# Patient Record
Sex: MALE | Race: WHITE | Employment: OTHER | URBAN - METROPOLITAN AREA
[De-identification: names, ages, dates, MRNs, and addresses within clinical notes are randomized per-mention and may not be internally consistent; named-entity substitution may affect disease eponyms.]

---

## 2017-01-01 ENCOUNTER — TELEPHONE (OUTPATIENT)
Dept: ADMINISTRATIVE | Age: 67
End: 2017-01-01

## 2018-01-01 ENCOUNTER — APPOINTMENT (OUTPATIENT)
Dept: GENERAL RADIOLOGY | Age: 68
DRG: 853 | End: 2018-01-01
Payer: MEDICARE

## 2018-01-01 ENCOUNTER — APPOINTMENT (OUTPATIENT)
Dept: CT IMAGING | Age: 68
DRG: 853 | End: 2018-01-01
Payer: MEDICARE

## 2018-01-01 ENCOUNTER — HOSPITAL ENCOUNTER (INPATIENT)
Age: 68
LOS: 11 days | Discharge: HOSPICE | DRG: 853 | End: 2018-01-23
Attending: EMERGENCY MEDICINE | Admitting: INTERNAL MEDICINE
Payer: MEDICARE

## 2018-01-01 ENCOUNTER — ANESTHESIA EVENT (OUTPATIENT)
Dept: OPERATING ROOM | Age: 68
DRG: 853 | End: 2018-01-01
Payer: MEDICARE

## 2018-01-01 ENCOUNTER — APPOINTMENT (OUTPATIENT)
Dept: INTERVENTIONAL RADIOLOGY/VASCULAR | Age: 68
DRG: 853 | End: 2018-01-01
Payer: MEDICARE

## 2018-01-01 ENCOUNTER — ANESTHESIA (OUTPATIENT)
Dept: OPERATING ROOM | Age: 68
DRG: 853 | End: 2018-01-01
Payer: MEDICARE

## 2018-01-01 VITALS
SYSTOLIC BLOOD PRESSURE: 110 MMHG | TEMPERATURE: 98.7 F | BODY MASS INDEX: 19.31 KG/M2 | HEIGHT: 73 IN | RESPIRATION RATE: 23 BRPM | HEART RATE: 79 BPM | DIASTOLIC BLOOD PRESSURE: 62 MMHG | WEIGHT: 145.72 LBS | OXYGEN SATURATION: 98 %

## 2018-01-01 VITALS — DIASTOLIC BLOOD PRESSURE: 64 MMHG | SYSTOLIC BLOOD PRESSURE: 98 MMHG | OXYGEN SATURATION: 100 %

## 2018-01-01 VITALS — OXYGEN SATURATION: 100 % | TEMPERATURE: 93.4 F

## 2018-01-01 DIAGNOSIS — G10 HUNTINGTON'S CHOREA (HCC): ICD-10-CM

## 2018-01-01 DIAGNOSIS — R53.1 GENERAL WEAKNESS: Primary | ICD-10-CM

## 2018-01-01 DIAGNOSIS — J90 PLEURAL EFFUSION ON LEFT: ICD-10-CM

## 2018-01-01 LAB
ABO/RH: NORMAL
ALBUMIN SERPL-MCNC: 1.4 G/DL (ref 3.9–4.9)
ALBUMIN SERPL-MCNC: 1.5 G/DL (ref 3.9–4.9)
ALBUMIN SERPL-MCNC: 1.6 G/DL (ref 3.9–4.9)
ALBUMIN SERPL-MCNC: 1.7 G/DL (ref 3.9–4.9)
ALBUMIN SERPL-MCNC: 1.7 G/DL (ref 3.9–4.9)
ALBUMIN SERPL-MCNC: 1.8 G/DL (ref 3.9–4.9)
ALBUMIN SERPL-MCNC: 2.3 G/DL (ref 3.9–4.9)
ALP BLD-CCNC: 107 U/L (ref 35–104)
ALP BLD-CCNC: 212 U/L (ref 35–104)
ALP BLD-CCNC: 473 U/L (ref 35–104)
ALP BLD-CCNC: 477 U/L (ref 35–104)
ALT SERPL-CCNC: 168 U/L (ref 0–41)
ALT SERPL-CCNC: 67 U/L (ref 0–41)
ALT SERPL-CCNC: 819 U/L (ref 0–41)
ALT SERPL-CCNC: 864 U/L (ref 0–41)
ANAEROBIC CULTURE: ABNORMAL
ANAEROBIC CULTURE: ABNORMAL
ANION GAP SERPL CALCULATED.3IONS-SCNC: 10 MEQ/L (ref 7–13)
ANION GAP SERPL CALCULATED.3IONS-SCNC: 11 MEQ/L (ref 7–13)
ANION GAP SERPL CALCULATED.3IONS-SCNC: 12 MEQ/L (ref 7–13)
ANION GAP SERPL CALCULATED.3IONS-SCNC: 13 MEQ/L (ref 7–13)
ANION GAP SERPL CALCULATED.3IONS-SCNC: 13 MEQ/L (ref 7–13)
ANION GAP SERPL CALCULATED.3IONS-SCNC: 14 MEQ/L (ref 7–13)
ANION GAP SERPL CALCULATED.3IONS-SCNC: 15 MEQ/L (ref 7–13)
ANION GAP SERPL CALCULATED.3IONS-SCNC: 16 MEQ/L (ref 7–13)
ANION GAP SERPL CALCULATED.3IONS-SCNC: 2 MEQ/L (ref 7–13)
ANION GAP SERPL CALCULATED.3IONS-SCNC: 7 MEQ/L (ref 7–13)
ANION GAP SERPL CALCULATED.3IONS-SCNC: 9 MEQ/L (ref 7–13)
ANION GAP SERPL CALCULATED.3IONS-SCNC: 9 MEQ/L (ref 7–13)
ANISOCYTOSIS: ABNORMAL
ANISOCYTOSIS: ABNORMAL
ANTIBODY SCREEN: NORMAL
APTT: 35.7 SEC (ref 21.6–35.4)
APTT: 39.1 SEC (ref 21.6–35.4)
AST SERPL-CCNC: 160 U/L (ref 0–40)
AST SERPL-CCNC: 472 U/L (ref 0–40)
AST SERPL-CCNC: 557 U/L (ref 0–40)
AST SERPL-CCNC: 73 U/L (ref 0–40)
BACTERIA: ABNORMAL /HPF
BANDED NEUTROPHILS RELATIVE PERCENT: 21 %
BANDED NEUTROPHILS RELATIVE PERCENT: 28 %
BASE EXCESS ARTERIAL: -3 (ref -3–3)
BASE EXCESS ARTERIAL: -5 (ref -3–3)
BASOPHILS ABSOLUTE: 0 K/UL (ref 0–0.2)
BASOPHILS RELATIVE PERCENT: 0 %
BASOPHILS RELATIVE PERCENT: 0.1 %
BASOPHILS RELATIVE PERCENT: 0.1 %
BASOPHILS RELATIVE PERCENT: 0.2 %
BILIRUB SERPL-MCNC: 0.6 MG/DL (ref 0–1.2)
BILIRUB SERPL-MCNC: 1.7 MG/DL (ref 0–1.2)
BILIRUB SERPL-MCNC: 2 MG/DL (ref 0–1.2)
BILIRUB SERPL-MCNC: 2 MG/DL (ref 0–1.2)
BILIRUBIN DIRECT: 1 MG/DL (ref 0–0.3)
BILIRUBIN URINE: ABNORMAL
BILIRUBIN URINE: ABNORMAL
BILIRUBIN, INDIRECT: 0.7 MG/DL (ref 0–0.6)
BLOOD BANK DISPENSE STATUS: NORMAL
BLOOD BANK DISPENSE STATUS: NORMAL
BLOOD BANK PRODUCT CODE: NORMAL
BLOOD BANK PRODUCT CODE: NORMAL
BLOOD CULTURE, ROUTINE: NORMAL
BLOOD, URINE: ABNORMAL
BLOOD, URINE: NEGATIVE
BPU ID: NORMAL
BPU ID: NORMAL
BUN BLDV-MCNC: 23 MG/DL (ref 8–23)
BUN BLDV-MCNC: 25 MG/DL (ref 8–23)
BUN BLDV-MCNC: 29 MG/DL (ref 8–23)
BUN BLDV-MCNC: 30 MG/DL (ref 8–23)
BUN BLDV-MCNC: 31 MG/DL (ref 8–23)
BUN BLDV-MCNC: 32 MG/DL (ref 8–23)
BUN BLDV-MCNC: 48 MG/DL (ref 8–23)
BUN BLDV-MCNC: 50 MG/DL (ref 8–23)
BUN BLDV-MCNC: 51 MG/DL (ref 8–23)
BUN BLDV-MCNC: 53 MG/DL (ref 8–23)
BUN BLDV-MCNC: 56 MG/DL (ref 8–23)
BUN BLDV-MCNC: 58 MG/DL (ref 8–23)
BUN BLDV-MCNC: 65 MG/DL (ref 8–23)
BUN BLDV-MCNC: 78 MG/DL (ref 8–23)
CALCIUM SERPL-MCNC: 6.9 MG/DL (ref 8.6–10.2)
CALCIUM SERPL-MCNC: 7.1 MG/DL (ref 8.6–10.2)
CALCIUM SERPL-MCNC: 7.1 MG/DL (ref 8.6–10.2)
CALCIUM SERPL-MCNC: 7.2 MG/DL (ref 8.6–10.2)
CALCIUM SERPL-MCNC: 7.4 MG/DL (ref 8.6–10.2)
CALCIUM SERPL-MCNC: 7.4 MG/DL (ref 8.6–10.2)
CALCIUM SERPL-MCNC: 7.5 MG/DL (ref 8.6–10.2)
CALCIUM SERPL-MCNC: 7.5 MG/DL (ref 8.6–10.2)
CALCIUM SERPL-MCNC: 8 MG/DL (ref 8.6–10.2)
CALCIUM SERPL-MCNC: 8 MG/DL (ref 8.6–10.2)
CALCIUM SERPL-MCNC: 8.1 MG/DL (ref 8.6–10.2)
CALCIUM SERPL-MCNC: 8.2 MG/DL (ref 8.6–10.2)
CALCIUM SERPL-MCNC: 8.2 MG/DL (ref 8.6–10.2)
CALCIUM SERPL-MCNC: 8.6 MG/DL (ref 8.6–10.2)
CARBOXYHEMOGLOBIN: 3.3 % (ref 0–4)
CASTS 2: ABNORMAL /LPF
CASTS: ABNORMAL /LPF
CHLORIDE BLD-SCNC: 104 MEQ/L (ref 98–107)
CHLORIDE BLD-SCNC: 106 MEQ/L (ref 98–107)
CHLORIDE BLD-SCNC: 107 MEQ/L (ref 98–107)
CHLORIDE BLD-SCNC: 108 MEQ/L (ref 98–107)
CHLORIDE BLD-SCNC: 110 MEQ/L (ref 98–107)
CHLORIDE BLD-SCNC: 114 MEQ/L (ref 98–107)
CHLORIDE BLD-SCNC: 114 MEQ/L (ref 98–107)
CHLORIDE BLD-SCNC: 115 MEQ/L (ref 98–107)
CHLORIDE BLD-SCNC: 115 MEQ/L (ref 98–107)
CHLORIDE BLD-SCNC: 116 MEQ/L (ref 98–107)
CHLORIDE BLD-SCNC: 116 MEQ/L (ref 98–107)
CHLORIDE BLD-SCNC: 117 MEQ/L (ref 98–107)
CHLORIDE BLD-SCNC: 119 MEQ/L (ref 98–107)
CHLORIDE BLD-SCNC: 120 MEQ/L (ref 98–107)
CHLORIDE BLD-SCNC: 121 MEQ/L (ref 98–107)
CHLORIDE BLD-SCNC: 122 MEQ/L (ref 98–107)
CLARITY: ABNORMAL
CLARITY: ABNORMAL
CO2: 21 MEQ/L (ref 22–29)
CO2: 22 MEQ/L (ref 22–29)
CO2: 23 MEQ/L (ref 22–29)
CO2: 24 MEQ/L (ref 22–29)
CO2: 25 MEQ/L (ref 22–29)
CO2: 26 MEQ/L (ref 22–29)
CO2: 26 MEQ/L (ref 22–29)
CO2: 29 MEQ/L (ref 22–29)
COLOR: ABNORMAL
COLOR: ABNORMAL
CREAT SERPL-MCNC: 0.41 MG/DL (ref 0.7–1.2)
CREAT SERPL-MCNC: 0.47 MG/DL (ref 0.7–1.2)
CREAT SERPL-MCNC: 0.48 MG/DL (ref 0.7–1.2)
CREAT SERPL-MCNC: 0.61 MG/DL (ref 0.7–1.2)
CREAT SERPL-MCNC: 0.68 MG/DL (ref 0.7–1.2)
CREAT SERPL-MCNC: 0.71 MG/DL (ref 0.7–1.2)
CREAT SERPL-MCNC: 0.91 MG/DL (ref 0.7–1.2)
CREAT SERPL-MCNC: 0.98 MG/DL (ref 0.7–1.2)
CREAT SERPL-MCNC: 0.98 MG/DL (ref 0.7–1.2)
CREAT SERPL-MCNC: 1 MG/DL (ref 0.7–1.2)
CREAT SERPL-MCNC: 1.06 MG/DL (ref 0.7–1.2)
CREAT SERPL-MCNC: 1.08 MG/DL (ref 0.7–1.2)
CREAT SERPL-MCNC: 1.1 MG/DL (ref 0.7–1.2)
CREAT SERPL-MCNC: 1.41 MG/DL (ref 0.7–1.2)
CREAT SERPL-MCNC: 1.92 MG/DL (ref 0.7–1.2)
CREAT SERPL-MCNC: 2.07 MG/DL (ref 0.7–1.2)
CULTURE SURGICAL: ABNORMAL
CULTURE SURGICAL: ABNORMAL
CULTURE, BLOOD 2: NORMAL
DESCRIPTION BLOOD BANK: NORMAL
DESCRIPTION BLOOD BANK: NORMAL
EKG ATRIAL RATE: 105 BPM
EKG ATRIAL RATE: 83 BPM
EKG P AXIS: 76 DEGREES
EKG P AXIS: 81 DEGREES
EKG P-R INTERVAL: 130 MS
EKG P-R INTERVAL: 146 MS
EKG Q-T INTERVAL: 332 MS
EKG Q-T INTERVAL: 358 MS
EKG QRS DURATION: 66 MS
EKG QRS DURATION: 78 MS
EKG QTC CALCULATION (BAZETT): 420 MS
EKG QTC CALCULATION (BAZETT): 438 MS
EKG R AXIS: -3 DEGREES
EKG R AXIS: 50 DEGREES
EKG T AXIS: 41 DEGREES
EKG T AXIS: 72 DEGREES
EKG VENTRICULAR RATE: 105 BPM
EKG VENTRICULAR RATE: 83 BPM
EOSINOPHILS ABSOLUTE: 0 K/UL (ref 0–0.7)
EOSINOPHILS RELATIVE PERCENT: 0 %
FOLATE: 10.6 NG/ML (ref 7.3–26.1)
GFR AFRICAN AMERICAN: 39
GFR AFRICAN AMERICAN: 42.5
GFR AFRICAN AMERICAN: >60
GFR NON-AFRICAN AMERICAN: 32.2
GFR NON-AFRICAN AMERICAN: 35.1
GFR NON-AFRICAN AMERICAN: 50.2
GFR NON-AFRICAN AMERICAN: >60
GLOBULIN: 1.9 G/DL (ref 2.3–3.5)
GLOBULIN: 3 G/DL (ref 2.3–3.5)
GLOBULIN: 3.1 G/DL (ref 2.3–3.5)
GLUCOSE BLD-MCNC: 102 MG/DL (ref 74–109)
GLUCOSE BLD-MCNC: 105 MG/DL (ref 74–109)
GLUCOSE BLD-MCNC: 110 MG/DL (ref 74–109)
GLUCOSE BLD-MCNC: 110 MG/DL (ref 74–109)
GLUCOSE BLD-MCNC: 116 MG/DL (ref 74–109)
GLUCOSE BLD-MCNC: 129 MG/DL (ref 74–109)
GLUCOSE BLD-MCNC: 141 MG/DL (ref 74–109)
GLUCOSE BLD-MCNC: 155 MG/DL (ref 74–109)
GLUCOSE BLD-MCNC: 197 MG/DL (ref 74–109)
GLUCOSE BLD-MCNC: 74 MG/DL (ref 74–109)
GLUCOSE BLD-MCNC: 78 MG/DL (ref 74–109)
GLUCOSE BLD-MCNC: 78 MG/DL (ref 74–109)
GLUCOSE BLD-MCNC: 84 MG/DL (ref 74–109)
GLUCOSE BLD-MCNC: 94 MG/DL (ref 74–109)
GLUCOSE BLD-MCNC: 95 MG/DL (ref 74–109)
GLUCOSE BLD-MCNC: 98 MG/DL (ref 74–109)
GLUCOSE URINE: 500 MG/DL
GLUCOSE URINE: NEGATIVE MG/DL
GRAM STAIN RESULT: ABNORMAL
GRAM STAIN RESULT: ABNORMAL
HCO3 ARTERIAL: 20.3 MMOL/L (ref 21–29)
HCO3 ARTERIAL: 21.2 MMOL/L (ref 21–29)
HCT VFR BLD CALC: 28.5 % (ref 42–52)
HCT VFR BLD CALC: 28.5 % (ref 42–52)
HCT VFR BLD CALC: 28.8 % (ref 42–52)
HCT VFR BLD CALC: 30.2 % (ref 42–52)
HCT VFR BLD CALC: 30.9 % (ref 42–52)
HCT VFR BLD CALC: 35.9 % (ref 42–52)
HCT VFR BLD CALC: 36.2 % (ref 42–52)
HCT VFR BLD CALC: 36.3 % (ref 42–52)
HCT VFR BLD CALC: 39.1 % (ref 42–52)
HCT VFR BLD CALC: 39.3 % (ref 42–52)
HCT VFR BLD CALC: 39.5 % (ref 42–52)
HCT VFR BLD CALC: 40 % (ref 42–52)
HCT VFR BLD CALC: 42 % (ref 42–52)
HEMOGLOBIN: 10 G/DL (ref 14–18)
HEMOGLOBIN: 11.3 G/DL (ref 14–18)
HEMOGLOBIN: 11.4 G/DL (ref 14–18)
HEMOGLOBIN: 11.6 G/DL (ref 14–18)
HEMOGLOBIN: 12.6 G/DL (ref 14–18)
HEMOGLOBIN: 12.6 G/DL (ref 14–18)
HEMOGLOBIN: 12.8 G/DL (ref 14–18)
HEMOGLOBIN: 12.9 G/DL (ref 14–18)
HEMOGLOBIN: 13.6 G/DL (ref 14–18)
HEMOGLOBIN: 9.3 G/DL (ref 14–18)
HEMOGLOBIN: 9.8 G/DL (ref 14–18)
HYPOCHROMIA: ABNORMAL
HYPOCHROMIA: ABNORMAL
INR BLD: 1.3
INR BLD: 1.4
KETONES, URINE: 15 MG/DL
KETONES, URINE: NEGATIVE MG/DL
LACTATE: 2.06 MMOL/L (ref 0.4–2)
LACTATE: 3.19 MMOL/L (ref 0.4–2)
LACTIC ACID: 4.2 MMOL/L (ref 0.5–2.2)
LEUKOCYTE ESTERASE, URINE: ABNORMAL
LEUKOCYTE ESTERASE, URINE: NEGATIVE
LYMPHOCYTES ABSOLUTE: 0.2 K/UL (ref 1–4.8)
LYMPHOCYTES ABSOLUTE: 0.6 K/UL (ref 1–4.8)
LYMPHOCYTES ABSOLUTE: 0.7 K/UL (ref 1–4.8)
LYMPHOCYTES ABSOLUTE: 1 K/UL (ref 1–4.8)
LYMPHOCYTES RELATIVE PERCENT: 1 %
LYMPHOCYTES RELATIVE PERCENT: 3 %
LYMPHOCYTES RELATIVE PERCENT: 3.1 %
LYMPHOCYTES RELATIVE PERCENT: 3.8 %
MACROCYTES: ABNORMAL
MACROCYTES: ABNORMAL
MAGNESIUM: 1.8 MG/DL (ref 1.7–2.3)
MAGNESIUM: 2.5 MG/DL (ref 1.7–2.3)
MAGNESIUM: 2.5 MG/DL (ref 1.7–2.3)
MAGNESIUM: 2.6 MG/DL (ref 1.7–2.3)
MCH RBC QN AUTO: 32.9 PG (ref 27–31.3)
MCH RBC QN AUTO: 32.9 PG (ref 27–31.3)
MCH RBC QN AUTO: 33.1 PG (ref 27–31.3)
MCH RBC QN AUTO: 33.2 PG (ref 27–31.3)
MCH RBC QN AUTO: 33.2 PG (ref 27–31.3)
MCH RBC QN AUTO: 33.4 PG (ref 27–31.3)
MCH RBC QN AUTO: 33.5 PG (ref 27–31.3)
MCHC RBC AUTO-ENTMCNC: 31.5 % (ref 33–37)
MCHC RBC AUTO-ENTMCNC: 31.5 % (ref 33–37)
MCHC RBC AUTO-ENTMCNC: 31.9 % (ref 33–37)
MCHC RBC AUTO-ENTMCNC: 32 % (ref 33–37)
MCHC RBC AUTO-ENTMCNC: 32.2 % (ref 33–37)
MCHC RBC AUTO-ENTMCNC: 32.2 % (ref 33–37)
MCHC RBC AUTO-ENTMCNC: 32.3 % (ref 33–37)
MCHC RBC AUTO-ENTMCNC: 32.3 % (ref 33–37)
MCHC RBC AUTO-ENTMCNC: 32.4 % (ref 33–37)
MCHC RBC AUTO-ENTMCNC: 32.5 % (ref 33–37)
MCHC RBC AUTO-ENTMCNC: 32.6 % (ref 33–37)
MCHC RBC AUTO-ENTMCNC: 32.6 % (ref 33–37)
MCHC RBC AUTO-ENTMCNC: 32.7 % (ref 33–37)
MCV RBC AUTO: 101.7 FL (ref 80–100)
MCV RBC AUTO: 102.3 FL (ref 80–100)
MCV RBC AUTO: 102.4 FL (ref 80–100)
MCV RBC AUTO: 102.5 FL (ref 80–100)
MCV RBC AUTO: 102.5 FL (ref 80–100)
MCV RBC AUTO: 102.7 FL (ref 80–100)
MCV RBC AUTO: 103.7 FL (ref 80–100)
MCV RBC AUTO: 103.8 FL (ref 80–100)
MCV RBC AUTO: 103.8 FL (ref 80–100)
MCV RBC AUTO: 104.2 FL (ref 80–100)
MCV RBC AUTO: 104.4 FL (ref 80–100)
MCV RBC AUTO: 104.5 FL (ref 80–100)
MCV RBC AUTO: 104.7 FL (ref 80–100)
MICROCYTES: 0
MONOCYTES ABSOLUTE: 0.3 K/UL (ref 0.2–0.8)
MONOCYTES ABSOLUTE: 0.5 K/UL (ref 0.2–0.8)
MONOCYTES ABSOLUTE: 0.8 K/UL (ref 0.2–0.8)
MONOCYTES ABSOLUTE: 1 K/UL (ref 0.2–0.8)
MONOCYTES RELATIVE PERCENT: 1.5 %
MONOCYTES RELATIVE PERCENT: 2.5 %
MONOCYTES RELATIVE PERCENT: 2.8 %
MONOCYTES RELATIVE PERCENT: 4.1 %
NEUTROPHILS ABSOLUTE: 18.1 K/UL (ref 1.4–6.5)
NEUTROPHILS ABSOLUTE: 19 K/UL (ref 1.4–6.5)
NEUTROPHILS ABSOLUTE: 20 K/UL (ref 1.4–6.5)
NEUTROPHILS ABSOLUTE: 30.4 K/UL (ref 1.4–6.5)
NEUTROPHILS RELATIVE PERCENT: 66 %
NEUTROPHILS RELATIVE PERCENT: 74 %
NEUTROPHILS RELATIVE PERCENT: 94.2 %
NEUTROPHILS RELATIVE PERCENT: 94.6 %
NITRITE, URINE: NEGATIVE
NITRITE, URINE: POSITIVE
O2 SAT, ARTERIAL: 100 % (ref 93–100)
O2 SAT, ARTERIAL: 100 % (ref 93–100)
ORGANISM: ABNORMAL
ORGANISM: ABNORMAL
OSMOLALITY: 325 MOSM/KG (ref 280–303)
OVALOCYTES: ABNORMAL
PCO2 ARTERIAL: 32 MM HG (ref 35–45)
PCO2 ARTERIAL: 33 MM HG (ref 35–45)
PDW BLD-RTO: 16.2 % (ref 11.5–14.5)
PDW BLD-RTO: 16.3 % (ref 11.5–14.5)
PDW BLD-RTO: 16.7 % (ref 11.5–14.5)
PDW BLD-RTO: 16.9 % (ref 11.5–14.5)
PDW BLD-RTO: 17 % (ref 11.5–14.5)
PDW BLD-RTO: 17.1 % (ref 11.5–14.5)
PDW BLD-RTO: 17.8 % (ref 11.5–14.5)
PDW BLD-RTO: 17.8 % (ref 11.5–14.5)
PDW BLD-RTO: 18 % (ref 11.5–14.5)
PERFORMED ON: ABNORMAL
PERFORMED ON: ABNORMAL
PH ARTERIAL: 7.39 (ref 7.35–7.45)
PH ARTERIAL: 7.43 (ref 7.35–7.45)
PH UA: 5 (ref 5–9)
PH UA: 5.5 (ref 5–9)
PHOSPHORUS: 1.8 MG/DL (ref 2.5–4.5)
PHOSPHORUS: 1.9 MG/DL (ref 2.5–4.5)
PHOSPHORUS: 2.2 MG/DL (ref 2.5–4.5)
PHOSPHORUS: 2.4 MG/DL (ref 2.5–4.5)
PHOSPHORUS: 2.6 MG/DL (ref 2.5–4.5)
PHOSPHORUS: 2.6 MG/DL (ref 2.5–4.5)
PHOSPHORUS: 3.6 MG/DL (ref 2.5–4.5)
PHOSPHORUS: 5.4 MG/DL (ref 2.5–4.5)
PLATELET # BLD: 101 K/UL (ref 130–400)
PLATELET # BLD: 104 K/UL (ref 130–400)
PLATELET # BLD: 123 K/UL (ref 130–400)
PLATELET # BLD: 150 K/UL (ref 130–400)
PLATELET # BLD: 50 K/UL (ref 130–400)
PLATELET # BLD: 51 K/UL (ref 130–400)
PLATELET # BLD: 52 K/UL (ref 130–400)
PLATELET # BLD: 57 K/UL (ref 130–400)
PLATELET # BLD: 63 K/UL (ref 130–400)
PLATELET # BLD: 76 K/UL (ref 130–400)
PLATELET # BLD: 79 K/UL (ref 130–400)
PLATELET # BLD: 91 K/UL (ref 130–400)
PLATELET # BLD: 96 K/UL (ref 130–400)
PLATELET SLIDE REVIEW: ABNORMAL
PLATELET SLIDE REVIEW: NORMAL
PO2 ARTERIAL: 158 MM HG (ref 75–108)
PO2 ARTERIAL: 437 MM HG (ref 75–108)
POC FIO2: 100
POC FIO2: 40
POC SAMPLE TYPE: ABNORMAL
POC SAMPLE TYPE: ABNORMAL
POIKILOCYTES: ABNORMAL
POLYCHROMASIA: 0
POTASSIUM SERPL-SCNC: 3.3 MEQ/L (ref 3.5–5.1)
POTASSIUM SERPL-SCNC: 3.4 MEQ/L (ref 3.5–5.1)
POTASSIUM SERPL-SCNC: 3.5 MEQ/L (ref 3.5–5.1)
POTASSIUM SERPL-SCNC: 3.5 MEQ/L (ref 3.5–5.1)
POTASSIUM SERPL-SCNC: 3.6 MEQ/L (ref 3.5–5.1)
POTASSIUM SERPL-SCNC: 3.9 MEQ/L (ref 3.5–5.1)
POTASSIUM SERPL-SCNC: 3.9 MEQ/L (ref 3.5–5.1)
POTASSIUM SERPL-SCNC: 4.1 MEQ/L (ref 3.5–5.1)
POTASSIUM SERPL-SCNC: 4.9 MEQ/L (ref 3.5–5.1)
POTASSIUM SERPL-SCNC: 5.3 MEQ/L (ref 3.5–5.1)
POTASSIUM SERPL-SCNC: 6.4 MEQ/L (ref 3.5–5.1)
POTASSIUM SERPL-SCNC: 6.8 MEQ/L (ref 3.5–5.1)
PROTEIN UA: 100 MG/DL
PROTEIN UA: NEGATIVE MG/DL
PROTHROMBIN TIME: 13.4 SEC (ref 8.1–13.7)
PROTHROMBIN TIME: 14.9 SEC (ref 8.1–13.7)
RAPID INFLUENZA  B AGN: NEGATIVE
RAPID INFLUENZA A AGN: NEGATIVE
RBC # BLD: 2.77 M/UL (ref 4.7–6.1)
RBC # BLD: 2.78 M/UL (ref 4.7–6.1)
RBC # BLD: 2.79 M/UL (ref 4.7–6.1)
RBC # BLD: 2.97 M/UL (ref 4.7–6.1)
RBC # BLD: 3.01 M/UL (ref 4.7–6.1)
RBC # BLD: 3.43 M/UL (ref 4.7–6.1)
RBC # BLD: 3.47 M/UL (ref 4.7–6.1)
RBC # BLD: 3.48 M/UL (ref 4.7–6.1)
RBC # BLD: 3.77 M/UL (ref 4.7–6.1)
RBC # BLD: 3.79 M/UL (ref 4.7–6.1)
RBC # BLD: 3.83 M/UL (ref 4.7–6.1)
RBC # BLD: 3.85 M/UL (ref 4.7–6.1)
RBC # BLD: 4.11 M/UL (ref 4.7–6.1)
RBC UA: ABNORMAL /HPF (ref 0–2)
RENAL EPITHELIAL, UA: ABNORMAL /HPF
SLIDE REVIEW: ABNORMAL
SODIUM BLD-SCNC: 140 MEQ/L (ref 132–144)
SODIUM BLD-SCNC: 143 MEQ/L (ref 132–144)
SODIUM BLD-SCNC: 145 MEQ/L (ref 132–144)
SODIUM BLD-SCNC: 146 MEQ/L (ref 132–144)
SODIUM BLD-SCNC: 146 MEQ/L (ref 132–144)
SODIUM BLD-SCNC: 147 MEQ/L (ref 132–144)
SODIUM BLD-SCNC: 148 MEQ/L (ref 132–144)
SODIUM BLD-SCNC: 148 MEQ/L (ref 132–144)
SODIUM BLD-SCNC: 149 MEQ/L (ref 132–144)
SODIUM BLD-SCNC: 150 MEQ/L (ref 132–144)
SODIUM BLD-SCNC: 155 MEQ/L (ref 132–144)
SODIUM BLD-SCNC: 156 MEQ/L (ref 132–144)
SODIUM BLD-SCNC: 157 MEQ/L (ref 132–144)
SODIUM BLD-SCNC: 159 MEQ/L (ref 132–144)
SPECIFIC GRAVITY UA: 1.02 (ref 1–1.03)
SPECIFIC GRAVITY UA: 1.03 (ref 1–1.03)
TCO2 ARTERIAL: 21 (ref 22–29)
TCO2 ARTERIAL: 22 (ref 22–29)
TOTAL PROTEIN: 3.7 G/DL (ref 6.4–8.1)
TOTAL PROTEIN: 3.9 G/DL (ref 6.4–8.1)
TOTAL PROTEIN: 4.4 G/DL (ref 6.4–8.1)
TOTAL PROTEIN: 5.4 G/DL (ref 6.4–8.1)
TROPONIN: 0.07 NG/ML (ref 0–0.01)
TROPONIN: 0.42 NG/ML (ref 0–0.01)
URINE REFLEX TO CULTURE: ABNORMAL
UROBILINOGEN, URINE: 1 E.U./DL
UROBILINOGEN, URINE: 2 E.U./DL
VITAMIN B-12: >2000 PG/ML (ref 211–946)
WBC # BLD: 12.8 K/UL (ref 4.8–10.8)
WBC # BLD: 13.2 K/UL (ref 4.8–10.8)
WBC # BLD: 14.3 K/UL (ref 4.8–10.8)
WBC # BLD: 14.7 K/UL (ref 4.8–10.8)
WBC # BLD: 14.8 K/UL (ref 4.8–10.8)
WBC # BLD: 15.5 K/UL (ref 4.8–10.8)
WBC # BLD: 19 K/UL (ref 4.8–10.8)
WBC # BLD: 19.1 K/UL (ref 4.8–10.8)
WBC # BLD: 20.3 K/UL (ref 4.8–10.8)
WBC # BLD: 21.1 K/UL (ref 4.8–10.8)
WBC # BLD: 29.3 K/UL (ref 4.8–10.8)
WBC # BLD: 31.6 K/UL (ref 4.8–10.8)
WBC # BLD: 32.3 K/UL (ref 4.8–10.8)
WBC UA: ABNORMAL /HPF (ref 0–5)

## 2018-01-01 PROCEDURE — 77001 FLUOROGUIDE FOR VEIN DEVICE: CPT | Performed by: RADIOLOGY

## 2018-01-01 PROCEDURE — 6370000000 HC RX 637 (ALT 250 FOR IP)

## 2018-01-01 PROCEDURE — 2580000003 HC RX 258: Performed by: EMERGENCY MEDICINE

## 2018-01-01 PROCEDURE — 2000000000 HC ICU R&B

## 2018-01-01 PROCEDURE — 2700000000 HC OXYGEN THERAPY PER DAY

## 2018-01-01 PROCEDURE — 2580000003 HC RX 258: Performed by: NURSE ANESTHETIST, CERTIFIED REGISTERED

## 2018-01-01 PROCEDURE — 3700000001 HC ADD 15 MINUTES (ANESTHESIA): Performed by: THORACIC SURGERY (CARDIOTHORACIC VASCULAR SURGERY)

## 2018-01-01 PROCEDURE — 80069 RENAL FUNCTION PANEL: CPT

## 2018-01-01 PROCEDURE — 2580000003 HC RX 258: Performed by: SURGERY

## 2018-01-01 PROCEDURE — 6360000002 HC RX W HCPCS: Performed by: NURSE ANESTHETIST, CERTIFIED REGISTERED

## 2018-01-01 PROCEDURE — 85027 COMPLETE CBC AUTOMATED: CPT

## 2018-01-01 PROCEDURE — 6370000000 HC RX 637 (ALT 250 FOR IP): Performed by: SURGERY

## 2018-01-01 PROCEDURE — 2580000003 HC RX 258: Performed by: THORACIC SURGERY (CARDIOTHORACIC VASCULAR SURGERY)

## 2018-01-01 PROCEDURE — 6360000002 HC RX W HCPCS: Performed by: INTERNAL MEDICINE

## 2018-01-01 PROCEDURE — 87070 CULTURE OTHR SPECIMN AEROBIC: CPT

## 2018-01-01 PROCEDURE — 71045 X-RAY EXAM CHEST 1 VIEW: CPT

## 2018-01-01 PROCEDURE — 82746 ASSAY OF FOLIC ACID SERUM: CPT

## 2018-01-01 PROCEDURE — 2500000003 HC RX 250 WO HCPCS: Performed by: INTERNAL MEDICINE

## 2018-01-01 PROCEDURE — 6360000002 HC RX W HCPCS: Performed by: SURGERY

## 2018-01-01 PROCEDURE — 76937 US GUIDE VASCULAR ACCESS: CPT | Performed by: RADIOLOGY

## 2018-01-01 PROCEDURE — 82803 BLOOD GASES ANY COMBINATION: CPT

## 2018-01-01 PROCEDURE — 6360000002 HC RX W HCPCS: Performed by: THORACIC SURGERY (CARDIOTHORACIC VASCULAR SURGERY)

## 2018-01-01 PROCEDURE — 84484 ASSAY OF TROPONIN QUANT: CPT

## 2018-01-01 PROCEDURE — 2580000003 HC RX 258: Performed by: INTERNAL MEDICINE

## 2018-01-01 PROCEDURE — 6370000000 HC RX 637 (ALT 250 FOR IP): Performed by: THORACIC SURGERY (CARDIOTHORACIC VASCULAR SURGERY)

## 2018-01-01 PROCEDURE — 1210000000 HC MED SURG R&B

## 2018-01-01 PROCEDURE — 87077 CULTURE AEROBIC IDENTIFY: CPT

## 2018-01-01 PROCEDURE — 99233 SBSQ HOSP IP/OBS HIGH 50: CPT | Performed by: INTERNAL MEDICINE

## 2018-01-01 PROCEDURE — 99222 1ST HOSP IP/OBS MODERATE 55: CPT | Performed by: INTERNAL MEDICINE

## 2018-01-01 PROCEDURE — 6360000004 HC RX CONTRAST MEDICATION: Performed by: INTERNAL MEDICINE

## 2018-01-01 PROCEDURE — 36592 COLLECT BLOOD FROM PICC: CPT

## 2018-01-01 PROCEDURE — 6370000000 HC RX 637 (ALT 250 FOR IP): Performed by: INTERNAL MEDICINE

## 2018-01-01 PROCEDURE — 7100000000 HC PACU RECOVERY - FIRST 15 MIN

## 2018-01-01 PROCEDURE — 83930 ASSAY OF BLOOD OSMOLALITY: CPT

## 2018-01-01 PROCEDURE — 87040 BLOOD CULTURE FOR BACTERIA: CPT

## 2018-01-01 PROCEDURE — 70450 CT HEAD/BRAIN W/O DYE: CPT

## 2018-01-01 PROCEDURE — 36415 COLL VENOUS BLD VENIPUNCTURE: CPT

## 2018-01-01 PROCEDURE — A6257 TRANSPARENT FILM <= 16 SQ IN: HCPCS | Performed by: THORACIC SURGERY (CARDIOTHORACIC VASCULAR SURGERY)

## 2018-01-01 PROCEDURE — 2500000003 HC RX 250 WO HCPCS: Performed by: NURSE ANESTHETIST, CERTIFIED REGISTERED

## 2018-01-01 PROCEDURE — 96365 THER/PROPH/DIAG IV INF INIT: CPT

## 2018-01-01 PROCEDURE — G8979 MOBILITY GOAL STATUS: HCPCS

## 2018-01-01 PROCEDURE — 3700000001 HC ADD 15 MINUTES (ANESTHESIA): Performed by: SURGERY

## 2018-01-01 PROCEDURE — 71250 CT THORAX DX C-: CPT

## 2018-01-01 PROCEDURE — 80048 BASIC METABOLIC PNL TOTAL CA: CPT

## 2018-01-01 PROCEDURE — 87205 SMEAR GRAM STAIN: CPT

## 2018-01-01 PROCEDURE — 36600 WITHDRAWAL OF ARTERIAL BLOOD: CPT

## 2018-01-01 PROCEDURE — 80053 COMPREHEN METABOLIC PANEL: CPT

## 2018-01-01 PROCEDURE — 87086 URINE CULTURE/COLONY COUNT: CPT

## 2018-01-01 PROCEDURE — 3700000000 HC ANESTHESIA ATTENDED CARE: Performed by: THORACIC SURGERY (CARDIOTHORACIC VASCULAR SURGERY)

## 2018-01-01 PROCEDURE — 7100000001 HC PACU RECOVERY - ADDTL 15 MIN: Performed by: SURGERY

## 2018-01-01 PROCEDURE — 3700000000 HC ANESTHESIA ATTENDED CARE: Performed by: SURGERY

## 2018-01-01 PROCEDURE — C1751 CATH, INF, PER/CENT/MIDLINE: HCPCS

## 2018-01-01 PROCEDURE — C9113 INJ PANTOPRAZOLE SODIUM, VIA: HCPCS | Performed by: INTERNAL MEDICINE

## 2018-01-01 PROCEDURE — 97163 PT EVAL HIGH COMPLEX 45 MIN: CPT

## 2018-01-01 PROCEDURE — 85025 COMPLETE CBC W/AUTO DIFF WBC: CPT

## 2018-01-01 PROCEDURE — 87102 FUNGUS ISOLATION CULTURE: CPT

## 2018-01-01 PROCEDURE — 87186 SC STD MICRODIL/AGAR DIL: CPT

## 2018-01-01 PROCEDURE — 84100 ASSAY OF PHOSPHORUS: CPT

## 2018-01-01 PROCEDURE — 2500000003 HC RX 250 WO HCPCS: Performed by: SURGERY

## 2018-01-01 PROCEDURE — 97167 OT EVAL HIGH COMPLEX 60 MIN: CPT

## 2018-01-01 PROCEDURE — 86901 BLOOD TYPING SEROLOGIC RH(D): CPT

## 2018-01-01 PROCEDURE — 86900 BLOOD TYPING SEROLOGIC ABO: CPT

## 2018-01-01 PROCEDURE — A6252 ABSORPT DRG >16 <=48 W/O BDR: HCPCS | Performed by: THORACIC SURGERY (CARDIOTHORACIC VASCULAR SURGERY)

## 2018-01-01 PROCEDURE — 93005 ELECTROCARDIOGRAM TRACING: CPT

## 2018-01-01 PROCEDURE — 93010 ELECTROCARDIOGRAM REPORT: CPT | Performed by: INTERNAL MEDICINE

## 2018-01-01 PROCEDURE — 0BDP4ZZ EXTRACTION OF LEFT PLEURA, PERCUTANEOUS ENDOSCOPIC APPROACH: ICD-10-PCS | Performed by: THORACIC SURGERY (CARDIOTHORACIC VASCULAR SURGERY)

## 2018-01-01 PROCEDURE — 0BH17EZ INSERTION OF ENDOTRACHEAL AIRWAY INTO TRACHEA, VIA NATURAL OR ARTIFICIAL OPENING: ICD-10-PCS | Performed by: INTERNAL MEDICINE

## 2018-01-01 PROCEDURE — 0DH63UZ INSERTION OF FEEDING DEVICE INTO STOMACH, PERCUTANEOUS APPROACH: ICD-10-PCS | Performed by: INTERNAL MEDICINE

## 2018-01-01 PROCEDURE — 87075 CULTR BACTERIA EXCEPT BLOOD: CPT

## 2018-01-01 PROCEDURE — 36569 INSJ PICC 5 YR+ W/O IMAGING: CPT | Performed by: RADIOLOGY

## 2018-01-01 PROCEDURE — 3609017100 HC EGD: Performed by: SURGERY

## 2018-01-01 PROCEDURE — 83605 ASSAY OF LACTIC ACID: CPT

## 2018-01-01 PROCEDURE — 99213 OFFICE O/P EST LOW 20 MIN: CPT

## 2018-01-01 PROCEDURE — 7100000000 HC PACU RECOVERY - FIRST 15 MIN: Performed by: SURGERY

## 2018-01-01 PROCEDURE — 99232 SBSQ HOSP IP/OBS MODERATE 35: CPT | Performed by: INTERNAL MEDICINE

## 2018-01-01 PROCEDURE — 83735 ASSAY OF MAGNESIUM: CPT

## 2018-01-01 PROCEDURE — 3609018000 HC EGD ESOPHAGOGASTRODUODENOSCOPY PEG TUBE INSERTION: Performed by: SURGERY

## 2018-01-01 PROCEDURE — 82607 VITAMIN B-12: CPT

## 2018-01-01 PROCEDURE — 6360000002 HC RX W HCPCS

## 2018-01-01 PROCEDURE — 86403 PARTICLE AGGLUT ANTBDY SCRN: CPT

## 2018-01-01 PROCEDURE — 3600000014 HC SURGERY LEVEL 4 ADDTL 15MIN: Performed by: THORACIC SURGERY (CARDIOTHORACIC VASCULAR SURGERY)

## 2018-01-01 PROCEDURE — 99285 EMERGENCY DEPT VISIT HI MDM: CPT

## 2018-01-01 PROCEDURE — G8988 SELF CARE GOAL STATUS: HCPCS

## 2018-01-01 PROCEDURE — 96375 TX/PRO/DX INJ NEW DRUG ADDON: CPT

## 2018-01-01 PROCEDURE — 86920 COMPATIBILITY TEST SPIN: CPT

## 2018-01-01 PROCEDURE — G8978 MOBILITY CURRENT STATUS: HCPCS

## 2018-01-01 PROCEDURE — 99291 CRITICAL CARE FIRST HOUR: CPT | Performed by: INTERNAL MEDICINE

## 2018-01-01 PROCEDURE — 99211 OFF/OP EST MAY X REQ PHY/QHP: CPT

## 2018-01-01 PROCEDURE — 71260 CT THORAX DX C+: CPT

## 2018-01-01 PROCEDURE — 2580000003 HC RX 258

## 2018-01-01 PROCEDURE — 7100000001 HC PACU RECOVERY - ADDTL 15 MIN

## 2018-01-01 PROCEDURE — 85610 PROTHROMBIN TIME: CPT

## 2018-01-01 PROCEDURE — 86850 RBC ANTIBODY SCREEN: CPT

## 2018-01-01 PROCEDURE — 0BJ08ZZ INSPECTION OF TRACHEOBRONCHIAL TREE, VIA NATURAL OR ARTIFICIAL OPENING ENDOSCOPIC: ICD-10-PCS | Performed by: THORACIC SURGERY (CARDIOTHORACIC VASCULAR SURGERY)

## 2018-01-01 PROCEDURE — 92610 EVALUATE SWALLOWING FUNCTION: CPT

## 2018-01-01 PROCEDURE — 81001 URINALYSIS AUTO W/SCOPE: CPT

## 2018-01-01 PROCEDURE — 94002 VENT MGMT INPAT INIT DAY: CPT

## 2018-01-01 PROCEDURE — 5A1935Z RESPIRATORY VENTILATION, LESS THAN 24 CONSECUTIVE HOURS: ICD-10-PCS | Performed by: INTERNAL MEDICINE

## 2018-01-01 PROCEDURE — 85730 THROMBOPLASTIN TIME PARTIAL: CPT

## 2018-01-01 PROCEDURE — 6360000002 HC RX W HCPCS: Performed by: EMERGENCY MEDICINE

## 2018-01-01 PROCEDURE — 82375 ASSAY CARBOXYHB QUANT: CPT

## 2018-01-01 PROCEDURE — 3600000004 HC SURGERY LEVEL 4 BASE: Performed by: THORACIC SURGERY (CARDIOTHORACIC VASCULAR SURGERY)

## 2018-01-01 PROCEDURE — G8987 SELF CARE CURRENT STATUS: HCPCS

## 2018-01-01 PROCEDURE — 81003 URINALYSIS AUTO W/O SCOPE: CPT

## 2018-01-01 PROCEDURE — P9041 ALBUMIN (HUMAN),5%, 50ML: HCPCS | Performed by: NURSE ANESTHETIST, CERTIFIED REGISTERED

## 2018-01-01 PROCEDURE — 94003 VENT MGMT INPAT SUBQ DAY: CPT

## 2018-01-01 PROCEDURE — 92526 ORAL FUNCTION THERAPY: CPT

## 2018-01-01 PROCEDURE — 2720000010 HC SURG SUPPLY STERILE: Performed by: THORACIC SURGERY (CARDIOTHORACIC VASCULAR SURGERY)

## 2018-01-01 PROCEDURE — 6360000002 HC RX W HCPCS: Performed by: ANESTHESIOLOGY

## 2018-01-01 PROCEDURE — 02HV33Z INSERTION OF INFUSION DEVICE INTO SUPERIOR VENA CAVA, PERCUTANEOUS APPROACH: ICD-10-PCS | Performed by: INTERNAL MEDICINE

## 2018-01-01 RX ORDER — MORPHINE SULFATE 4 MG/ML
4 INJECTION, SOLUTION INTRAMUSCULAR; INTRAVENOUS
Status: DISCONTINUED | OUTPATIENT
Start: 2018-01-01 | End: 2018-01-01 | Stop reason: HOSPADM

## 2018-01-01 RX ORDER — SODIUM CHLORIDE 9 MG/ML
INJECTION, SOLUTION INTRAVENOUS
Status: COMPLETED
Start: 2018-01-01 | End: 2018-01-01

## 2018-01-01 RX ORDER — SODIUM CHLORIDE 0.9 % (FLUSH) 0.9 %
3 SYRINGE (ML) INJECTION EVERY 8 HOURS
Status: DISCONTINUED | OUTPATIENT
Start: 2018-01-01 | End: 2018-01-01 | Stop reason: HOSPADM

## 2018-01-01 RX ORDER — DEXTROSE AND SODIUM CHLORIDE 5; .45 G/100ML; G/100ML
INJECTION, SOLUTION INTRAVENOUS CONTINUOUS
Status: DISCONTINUED | OUTPATIENT
Start: 2018-01-01 | End: 2018-01-01

## 2018-01-01 RX ORDER — METOPROLOL TARTRATE 5 MG/5ML
5 INJECTION INTRAVENOUS
Status: DISCONTINUED | OUTPATIENT
Start: 2018-01-01 | End: 2018-01-01

## 2018-01-01 RX ORDER — NICOTINE POLACRILEX 4 MG
15 LOZENGE BUCCAL PRN
Status: DISCONTINUED | OUTPATIENT
Start: 2018-01-01 | End: 2018-01-01 | Stop reason: HOSPADM

## 2018-01-01 RX ORDER — SODIUM CHLORIDE, SODIUM LACTATE, POTASSIUM CHLORIDE, CALCIUM CHLORIDE 600; 310; 30; 20 MG/100ML; MG/100ML; MG/100ML; MG/100ML
INJECTION, SOLUTION INTRAVENOUS CONTINUOUS
Status: CANCELLED | OUTPATIENT
Start: 2018-01-01

## 2018-01-01 RX ORDER — 0.9 % SODIUM CHLORIDE 0.9 %
250 INTRAVENOUS SOLUTION INTRAVENOUS ONCE
Status: COMPLETED | OUTPATIENT
Start: 2018-01-01 | End: 2018-01-01

## 2018-01-01 RX ORDER — EPHEDRINE SULFATE 50 MG/ML
INJECTION, SOLUTION INTRAVENOUS PRN
Status: DISCONTINUED | OUTPATIENT
Start: 2018-01-01 | End: 2018-01-01 | Stop reason: SDUPTHER

## 2018-01-01 RX ORDER — CHLORHEXIDINE GLUCONATE 0.12 MG/ML
15 RINSE ORAL 2 TIMES DAILY
Status: DISCONTINUED | OUTPATIENT
Start: 2018-01-01 | End: 2018-01-01 | Stop reason: SDUPTHER

## 2018-01-01 RX ORDER — POTASSIUM CHLORIDE 20 MEQ/1
40 TABLET, EXTENDED RELEASE ORAL ONCE
Status: COMPLETED | OUTPATIENT
Start: 2018-01-01 | End: 2018-01-01

## 2018-01-01 RX ORDER — SODIUM CHLORIDE 9 MG/ML
250 INJECTION, SOLUTION INTRAVENOUS ONCE
Status: COMPLETED | OUTPATIENT
Start: 2018-01-01 | End: 2018-01-01

## 2018-01-01 RX ORDER — DEXTROSE MONOHYDRATE 25 G/50ML
12.5 INJECTION, SOLUTION INTRAVENOUS PRN
Status: DISCONTINUED | OUTPATIENT
Start: 2018-01-01 | End: 2018-01-01 | Stop reason: HOSPADM

## 2018-01-01 RX ORDER — LIDOCAINE HYDROCHLORIDE 20 MG/ML
5 INJECTION, SOLUTION INFILTRATION; PERINEURAL ONCE
Status: COMPLETED | OUTPATIENT
Start: 2018-01-01 | End: 2018-01-01

## 2018-01-01 RX ORDER — ONDANSETRON 2 MG/ML
INJECTION INTRAMUSCULAR; INTRAVENOUS PRN
Status: DISCONTINUED | OUTPATIENT
Start: 2018-01-01 | End: 2018-01-01 | Stop reason: SDUPTHER

## 2018-01-01 RX ORDER — METOPROLOL TARTRATE 5 MG/5ML
5 INJECTION INTRAVENOUS EVERY 30 MIN PRN
Status: DISCONTINUED | OUTPATIENT
Start: 2018-01-01 | End: 2018-01-01 | Stop reason: HOSPADM

## 2018-01-01 RX ORDER — SODIUM CHLORIDE, SODIUM LACTATE, POTASSIUM CHLORIDE, CALCIUM CHLORIDE 600; 310; 30; 20 MG/100ML; MG/100ML; MG/100ML; MG/100ML
INJECTION, SOLUTION INTRAVENOUS
Status: DISPENSED
Start: 2018-01-01 | End: 2018-01-01

## 2018-01-01 RX ORDER — ROCURONIUM BROMIDE 10 MG/ML
INJECTION, SOLUTION INTRAVENOUS PRN
Status: DISCONTINUED | OUTPATIENT
Start: 2018-01-01 | End: 2018-01-01 | Stop reason: SDUPTHER

## 2018-01-01 RX ORDER — LIDOCAINE HYDROCHLORIDE 20 MG/ML
INJECTION, SOLUTION INFILTRATION; PERINEURAL PRN
Status: DISCONTINUED | OUTPATIENT
Start: 2018-01-01 | End: 2018-01-01 | Stop reason: SDUPTHER

## 2018-01-01 RX ORDER — MEPERIDINE HYDROCHLORIDE 25 MG/ML
12.5 INJECTION INTRAMUSCULAR; INTRAVENOUS; SUBCUTANEOUS EVERY 5 MIN PRN
Status: DISCONTINUED | OUTPATIENT
Start: 2018-01-01 | End: 2018-01-01 | Stop reason: HOSPADM

## 2018-01-01 RX ORDER — SODIUM CHLORIDE, SODIUM LACTATE, POTASSIUM CHLORIDE, AND CALCIUM CHLORIDE .6; .31; .03; .02 G/100ML; G/100ML; G/100ML; G/100ML
500 INJECTION, SOLUTION INTRAVENOUS ONCE
Status: COMPLETED | OUTPATIENT
Start: 2018-01-01 | End: 2018-01-01

## 2018-01-01 RX ORDER — SODIUM CHLORIDE 0.9 % (FLUSH) 0.9 %
10 SYRINGE (ML) INJECTION EVERY 12 HOURS SCHEDULED
Status: CANCELLED | OUTPATIENT
Start: 2018-01-01

## 2018-01-01 RX ORDER — SODIUM CHLORIDE 0.9 % (FLUSH) 0.9 %
10 SYRINGE (ML) INJECTION PRN
Status: CANCELLED | OUTPATIENT
Start: 2018-01-01

## 2018-01-01 RX ORDER — ACETAMINOPHEN 160 MG/5ML
650 SOLUTION ORAL EVERY 6 HOURS PRN
Status: DISCONTINUED | OUTPATIENT
Start: 2018-01-01 | End: 2018-01-01 | Stop reason: HOSPADM

## 2018-01-01 RX ORDER — MAGNESIUM HYDROXIDE 1200 MG/15ML
LIQUID ORAL CONTINUOUS PRN
Status: DISCONTINUED | OUTPATIENT
Start: 2018-01-01 | End: 2018-01-01 | Stop reason: HOSPADM

## 2018-01-01 RX ORDER — CITALOPRAM 20 MG/1
20 TABLET ORAL DAILY
Status: DISCONTINUED | OUTPATIENT
Start: 2018-01-01 | End: 2018-01-01

## 2018-01-01 RX ORDER — FENTANYL CITRATE 50 UG/ML
25 INJECTION, SOLUTION INTRAMUSCULAR; INTRAVENOUS
Status: DISCONTINUED | OUTPATIENT
Start: 2018-01-01 | End: 2018-01-01

## 2018-01-01 RX ORDER — MIDAZOLAM HYDROCHLORIDE 1 MG/ML
INJECTION INTRAMUSCULAR; INTRAVENOUS PRN
Status: DISCONTINUED | OUTPATIENT
Start: 2018-01-01 | End: 2018-01-01 | Stop reason: SDUPTHER

## 2018-01-01 RX ORDER — SODIUM CHLORIDE 9 MG/ML
INJECTION, SOLUTION INTRAVENOUS CONTINUOUS
Status: DISCONTINUED | OUTPATIENT
Start: 2018-01-01 | End: 2018-01-01

## 2018-01-01 RX ORDER — DIMETHICONE, CAMPHOR (SYNTHETIC), MENTHOL, AND PHENOL 1.1; .5; .625; .5 G/100G; G/100G; G/100G; G/100G
OINTMENT TOPICAL
Status: COMPLETED
Start: 2018-01-01 | End: 2018-01-01

## 2018-01-01 RX ORDER — SODIUM CHLORIDE, SODIUM LACTATE, POTASSIUM CHLORIDE, CALCIUM CHLORIDE 600; 310; 30; 20 MG/100ML; MG/100ML; MG/100ML; MG/100ML
INJECTION, SOLUTION INTRAVENOUS CONTINUOUS PRN
Status: DISCONTINUED | OUTPATIENT
Start: 2018-01-01 | End: 2018-01-01 | Stop reason: SDUPTHER

## 2018-01-01 RX ORDER — MORPHINE SULFATE 2 MG/ML
2 INJECTION, SOLUTION INTRAMUSCULAR; INTRAVENOUS
Status: DISCONTINUED | OUTPATIENT
Start: 2018-01-01 | End: 2018-01-01 | Stop reason: DRUGHIGH

## 2018-01-01 RX ORDER — SODIUM CHLORIDE, SODIUM LACTATE, POTASSIUM CHLORIDE, CALCIUM CHLORIDE 600; 310; 30; 20 MG/100ML; MG/100ML; MG/100ML; MG/100ML
INJECTION, SOLUTION INTRAVENOUS CONTINUOUS
Status: DISPENSED | OUTPATIENT
Start: 2018-01-01 | End: 2018-01-01

## 2018-01-01 RX ORDER — DEXTROSE, SODIUM CHLORIDE, AND POTASSIUM CHLORIDE 5; .45; .15 G/100ML; G/100ML; G/100ML
INJECTION INTRAVENOUS CONTINUOUS
Status: DISCONTINUED | OUTPATIENT
Start: 2018-01-01 | End: 2018-01-01

## 2018-01-01 RX ORDER — LIDOCAINE HYDROCHLORIDE 10 MG/ML
1 INJECTION, SOLUTION EPIDURAL; INFILTRATION; INTRACAUDAL; PERINEURAL
Status: CANCELLED | OUTPATIENT
Start: 2018-01-01 | End: 2018-01-01

## 2018-01-01 RX ORDER — METOCLOPRAMIDE HYDROCHLORIDE 5 MG/ML
10 INJECTION INTRAMUSCULAR; INTRAVENOUS
Status: DISCONTINUED | OUTPATIENT
Start: 2018-01-01 | End: 2018-01-01 | Stop reason: HOSPADM

## 2018-01-01 RX ORDER — SODIUM CHLORIDE 9 MG/ML
INJECTION, SOLUTION INTRAVENOUS
Status: DISCONTINUED
Start: 2018-01-01 | End: 2018-01-01

## 2018-01-01 RX ORDER — HEPARIN SODIUM 10000 [USP'U]/100ML
12 INJECTION, SOLUTION INTRAVENOUS CONTINUOUS
Status: DISCONTINUED | OUTPATIENT
Start: 2018-01-01 | End: 2018-01-01

## 2018-01-01 RX ORDER — MORPHINE SULFATE 10 MG/ML
8 INJECTION, SOLUTION INTRAMUSCULAR; INTRAVENOUS
Status: CANCELLED | OUTPATIENT
Start: 2018-01-01

## 2018-01-01 RX ORDER — SODIUM CHLORIDE 0.9 % (FLUSH) 0.9 %
10 SYRINGE (ML) INJECTION EVERY 12 HOURS SCHEDULED
Status: DISCONTINUED | OUTPATIENT
Start: 2018-01-01 | End: 2018-01-01 | Stop reason: HOSPADM

## 2018-01-01 RX ORDER — SODIUM CHLORIDE, SODIUM LACTATE, POTASSIUM CHLORIDE, CALCIUM CHLORIDE 600; 310; 30; 20 MG/100ML; MG/100ML; MG/100ML; MG/100ML
INJECTION, SOLUTION INTRAVENOUS
Status: COMPLETED
Start: 2018-01-01 | End: 2018-01-01

## 2018-01-01 RX ORDER — SODIUM POLYSTYRENE SULFONATE 15 G/60ML
15 SUSPENSION ORAL; RECTAL ONCE
Status: COMPLETED | OUTPATIENT
Start: 2018-01-01 | End: 2018-01-01

## 2018-01-01 RX ORDER — SODIUM CHLORIDE, SODIUM LACTATE, POTASSIUM CHLORIDE, CALCIUM CHLORIDE 600; 310; 30; 20 MG/100ML; MG/100ML; MG/100ML; MG/100ML
INJECTION, SOLUTION INTRAVENOUS
Status: DISCONTINUED
Start: 2018-01-01 | End: 2018-01-01

## 2018-01-01 RX ORDER — CHLORHEXIDINE GLUCONATE 0.12 MG/ML
15 RINSE ORAL 2 TIMES DAILY
Status: DISCONTINUED | OUTPATIENT
Start: 2018-01-01 | End: 2018-01-01 | Stop reason: ALTCHOICE

## 2018-01-01 RX ORDER — LEVOFLOXACIN 5 MG/ML
250 INJECTION, SOLUTION INTRAVENOUS EVERY 24 HOURS
Status: DISCONTINUED | OUTPATIENT
Start: 2018-01-01 | End: 2018-01-01

## 2018-01-01 RX ORDER — SODIUM CHLORIDE 0.9 % (FLUSH) 0.9 %
10 SYRINGE (ML) INJECTION PRN
Status: DISCONTINUED | OUTPATIENT
Start: 2018-01-01 | End: 2018-01-01 | Stop reason: HOSPADM

## 2018-01-01 RX ORDER — DEXTROSE MONOHYDRATE 50 MG/ML
INJECTION, SOLUTION INTRAVENOUS CONTINUOUS
Status: DISCONTINUED | OUTPATIENT
Start: 2018-01-01 | End: 2018-01-01

## 2018-01-01 RX ORDER — PROPOFOL 10 MG/ML
10 INJECTION, EMULSION INTRAVENOUS
Status: DISCONTINUED | OUTPATIENT
Start: 2018-01-01 | End: 2018-01-01

## 2018-01-01 RX ORDER — HYDROCODONE BITARTRATE AND ACETAMINOPHEN 5; 325 MG/1; MG/1
2 TABLET ORAL PRN
Status: DISCONTINUED | OUTPATIENT
Start: 2018-01-01 | End: 2018-01-01 | Stop reason: HOSPADM

## 2018-01-01 RX ORDER — ONDANSETRON 2 MG/ML
4 INJECTION INTRAMUSCULAR; INTRAVENOUS EVERY 6 HOURS PRN
Status: DISCONTINUED | OUTPATIENT
Start: 2018-01-01 | End: 2018-01-01 | Stop reason: HOSPADM

## 2018-01-01 RX ORDER — DOCUSATE SODIUM 100 MG/1
100 CAPSULE, LIQUID FILLED ORAL 2 TIMES DAILY
Status: DISCONTINUED | OUTPATIENT
Start: 2018-01-01 | End: 2018-01-01

## 2018-01-01 RX ORDER — BUPIVACAINE HYDROCHLORIDE AND EPINEPHRINE 2.5; 5 MG/ML; UG/ML
INJECTION, SOLUTION EPIDURAL; INFILTRATION; INTRACAUDAL; PERINEURAL PRN
Status: DISCONTINUED | OUTPATIENT
Start: 2018-01-01 | End: 2018-01-01 | Stop reason: HOSPADM

## 2018-01-01 RX ORDER — LEVOFLOXACIN 5 MG/ML
500 INJECTION, SOLUTION INTRAVENOUS ONCE
Status: COMPLETED | OUTPATIENT
Start: 2018-01-01 | End: 2018-01-01

## 2018-01-01 RX ORDER — DEXTROSE MONOHYDRATE 50 MG/ML
INJECTION, SOLUTION INTRAVENOUS CONTINUOUS PRN
Status: DISCONTINUED | OUTPATIENT
Start: 2018-01-01 | End: 2018-01-01 | Stop reason: SDUPTHER

## 2018-01-01 RX ORDER — DIPHENHYDRAMINE HYDROCHLORIDE 50 MG/ML
12.5 INJECTION INTRAMUSCULAR; INTRAVENOUS
Status: DISCONTINUED | OUTPATIENT
Start: 2018-01-01 | End: 2018-01-01 | Stop reason: HOSPADM

## 2018-01-01 RX ORDER — 0.9 % SODIUM CHLORIDE 0.9 %
1000 INTRAVENOUS SOLUTION INTRAVENOUS ONCE
Status: COMPLETED | OUTPATIENT
Start: 2018-01-01 | End: 2018-01-01

## 2018-01-01 RX ORDER — SODIUM CHLORIDE 9 MG/ML
INJECTION, SOLUTION INTRAVENOUS CONTINUOUS
Status: CANCELLED | OUTPATIENT
Start: 2018-01-01

## 2018-01-01 RX ORDER — HYDROCODONE BITARTRATE AND ACETAMINOPHEN 5; 325 MG/1; MG/1
1 TABLET ORAL PRN
Status: DISCONTINUED | OUTPATIENT
Start: 2018-01-01 | End: 2018-01-01 | Stop reason: HOSPADM

## 2018-01-01 RX ORDER — PANTOPRAZOLE SODIUM 40 MG/10ML
40 INJECTION, POWDER, LYOPHILIZED, FOR SOLUTION INTRAVENOUS DAILY
Status: DISCONTINUED | OUTPATIENT
Start: 2018-01-01 | End: 2018-01-01

## 2018-01-01 RX ORDER — PROPOFOL 10 MG/ML
INJECTION, EMULSION INTRAVENOUS PRN
Status: DISCONTINUED | OUTPATIENT
Start: 2018-01-01 | End: 2018-01-01 | Stop reason: SDUPTHER

## 2018-01-01 RX ORDER — MAGNESIUM HYDROXIDE 1200 MG/15ML
LIQUID ORAL PRN
Status: DISCONTINUED | OUTPATIENT
Start: 2018-01-01 | End: 2018-01-01 | Stop reason: HOSPADM

## 2018-01-01 RX ORDER — PANTOPRAZOLE SODIUM 40 MG/1
40 GRANULE, DELAYED RELEASE ORAL
Status: DISCONTINUED | OUTPATIENT
Start: 2018-01-01 | End: 2018-01-01

## 2018-01-01 RX ORDER — LIDOCAINE HYDROCHLORIDE 10 MG/ML
INJECTION, SOLUTION EPIDURAL; INFILTRATION; INTRACAUDAL; PERINEURAL
Status: DISCONTINUED
Start: 2018-01-01 | End: 2018-01-01

## 2018-01-01 RX ORDER — SUCCINYLCHOLINE CHLORIDE 20 MG/ML
INJECTION INTRAMUSCULAR; INTRAVENOUS PRN
Status: DISCONTINUED | OUTPATIENT
Start: 2018-01-01 | End: 2018-01-01 | Stop reason: SDUPTHER

## 2018-01-01 RX ORDER — ONDANSETRON 2 MG/ML
4 INJECTION INTRAMUSCULAR; INTRAVENOUS EVERY 6 HOURS PRN
Status: DISCONTINUED | OUTPATIENT
Start: 2018-01-01 | End: 2018-01-01 | Stop reason: SDUPTHER

## 2018-01-01 RX ORDER — SENNA AND DOCUSATE SODIUM 50; 8.6 MG/1; MG/1
2 TABLET, FILM COATED ORAL 2 TIMES DAILY PRN
Status: DISCONTINUED | OUTPATIENT
Start: 2018-01-01 | End: 2018-01-01 | Stop reason: HOSPADM

## 2018-01-01 RX ORDER — MORPHINE SULFATE 4 MG/ML
INJECTION, SOLUTION INTRAMUSCULAR; INTRAVENOUS
Status: COMPLETED
Start: 2018-01-01 | End: 2018-01-01

## 2018-01-01 RX ORDER — CLINDAMYCIN PHOSPHATE 900 MG/50ML
900 INJECTION INTRAVENOUS EVERY 8 HOURS
Status: DISCONTINUED | OUTPATIENT
Start: 2018-01-01 | End: 2018-01-01

## 2018-01-01 RX ORDER — SODIUM CHLORIDE, SODIUM LACTATE, POTASSIUM CHLORIDE, CALCIUM CHLORIDE 600; 310; 30; 20 MG/100ML; MG/100ML; MG/100ML; MG/100ML
INJECTION, SOLUTION INTRAVENOUS CONTINUOUS
Status: DISCONTINUED | OUTPATIENT
Start: 2018-01-01 | End: 2018-01-01

## 2018-01-01 RX ORDER — DEXTROSE MONOHYDRATE 50 MG/ML
100 INJECTION, SOLUTION INTRAVENOUS PRN
Status: DISCONTINUED | OUTPATIENT
Start: 2018-01-01 | End: 2018-01-01 | Stop reason: HOSPADM

## 2018-01-01 RX ORDER — DEXTROSE MONOHYDRATE 25 G/50ML
25 INJECTION, SOLUTION INTRAVENOUS ONCE
Status: COMPLETED | OUTPATIENT
Start: 2018-01-01 | End: 2018-01-01

## 2018-01-01 RX ORDER — MORPHINE SULFATE 10 MG/ML
8 INJECTION, SOLUTION INTRAMUSCULAR; INTRAVENOUS
Status: DISCONTINUED | OUTPATIENT
Start: 2018-01-01 | End: 2018-01-01 | Stop reason: HOSPADM

## 2018-01-01 RX ORDER — DEXAMETHASONE SODIUM PHOSPHATE 4 MG/ML
INJECTION, SOLUTION INTRA-ARTICULAR; INTRALESIONAL; INTRAMUSCULAR; INTRAVENOUS; SOFT TISSUE PRN
Status: DISCONTINUED | OUTPATIENT
Start: 2018-01-01 | End: 2018-01-01 | Stop reason: SDUPTHER

## 2018-01-01 RX ORDER — ALBUMIN, HUMAN INJ 5% 5 %
SOLUTION INTRAVENOUS PRN
Status: DISCONTINUED | OUTPATIENT
Start: 2018-01-01 | End: 2018-01-01 | Stop reason: SDUPTHER

## 2018-01-01 RX ORDER — ONDANSETRON 2 MG/ML
4 INJECTION INTRAMUSCULAR; INTRAVENOUS
Status: DISCONTINUED | OUTPATIENT
Start: 2018-01-01 | End: 2018-01-01 | Stop reason: HOSPADM

## 2018-01-01 RX ORDER — ACETAMINOPHEN 160 MG/5ML
650 SOLUTION ORAL EVERY 6 HOURS PRN
Status: CANCELLED | OUTPATIENT
Start: 2018-01-01

## 2018-01-01 RX ORDER — MORPHINE SULFATE 4 MG/ML
4 INJECTION, SOLUTION INTRAMUSCULAR; INTRAVENOUS
Status: CANCELLED | OUTPATIENT
Start: 2018-01-01

## 2018-01-01 RX ORDER — LEVOFLOXACIN 5 MG/ML
500 INJECTION, SOLUTION INTRAVENOUS EVERY 24 HOURS
Status: DISCONTINUED | OUTPATIENT
Start: 2018-01-01 | End: 2018-01-01 | Stop reason: DRUGHIGH

## 2018-01-01 RX ORDER — MAGNESIUM HYDROXIDE 1200 MG/15ML
LIQUID ORAL
Status: DISCONTINUED
Start: 2018-01-01 | End: 2018-01-01

## 2018-01-01 RX ORDER — METOPROLOL TARTRATE 5 MG/5ML
5 INJECTION INTRAVENOUS EVERY 6 HOURS
Status: DISCONTINUED | OUTPATIENT
Start: 2018-01-01 | End: 2018-01-01 | Stop reason: ALTCHOICE

## 2018-01-01 RX ORDER — POLYETHYLENE GLYCOL 3350 17 G/17G
17 POWDER, FOR SOLUTION ORAL DAILY PRN
Status: DISCONTINUED | OUTPATIENT
Start: 2018-01-01 | End: 2018-01-01 | Stop reason: HOSPADM

## 2018-01-01 RX ORDER — FENTANYL CITRATE 50 UG/ML
50 INJECTION, SOLUTION INTRAMUSCULAR; INTRAVENOUS EVERY 10 MIN PRN
Status: DISCONTINUED | OUTPATIENT
Start: 2018-01-01 | End: 2018-01-01 | Stop reason: HOSPADM

## 2018-01-01 RX ORDER — ACETAMINOPHEN 325 MG/1
650 TABLET ORAL EVERY 4 HOURS PRN
Status: DISCONTINUED | OUTPATIENT
Start: 2018-01-01 | End: 2018-01-01 | Stop reason: HOSPADM

## 2018-01-01 RX ORDER — 0.9 % SODIUM CHLORIDE 0.9 %
10 VIAL (ML) INJECTION DAILY
Status: DISCONTINUED | OUTPATIENT
Start: 2018-01-01 | End: 2018-01-01

## 2018-01-01 RX ADMIN — ALBUMIN (HUMAN) 500 ML: 12.5 INJECTION, SOLUTION INTRAVENOUS at 12:18

## 2018-01-01 RX ADMIN — CITALOPRAM HYDROBROMIDE 20 MG: 20 TABLET ORAL at 11:09

## 2018-01-01 RX ADMIN — HYPROMELLOSE 2910 1 DROP: 5 SOLUTION OPHTHALMIC at 09:00

## 2018-01-01 RX ADMIN — PIPERACILLIN SODIUM AND TAZOBACTAM SODIUM 3.38 G: 3; .375 INJECTION, POWDER, LYOPHILIZED, FOR SOLUTION INTRAVENOUS at 04:20

## 2018-01-01 RX ADMIN — ENOXAPARIN SODIUM 40 MG: 40 INJECTION, SOLUTION INTRAVENOUS; SUBCUTANEOUS at 09:52

## 2018-01-01 RX ADMIN — HEPARIN SODIUM AND DEXTROSE 18 UNITS/KG/HR: 10000; 5 INJECTION INTRAVENOUS at 03:51

## 2018-01-01 RX ADMIN — COLLAGENASE SANTYL: 250 OINTMENT TOPICAL at 08:12

## 2018-01-01 RX ADMIN — Medication 10 ML: at 11:11

## 2018-01-01 RX ADMIN — COLLAGENASE SANTYL: 250 OINTMENT TOPICAL at 10:29

## 2018-01-01 RX ADMIN — DOCUSATE SODIUM 100 MG: 50 LIQUID ORAL at 14:48

## 2018-01-01 RX ADMIN — LEVOFLOXACIN 250 MG: 5 INJECTION, SOLUTION INTRAVENOUS at 13:49

## 2018-01-01 RX ADMIN — PIPERACILLIN SODIUM AND TAZOBACTAM SODIUM 3.38 G: 3; .375 INJECTION, POWDER, LYOPHILIZED, FOR SOLUTION INTRAVENOUS at 22:34

## 2018-01-01 RX ADMIN — AZITHROMYCIN MONOHYDRATE 500 MG: 500 INJECTION, POWDER, LYOPHILIZED, FOR SOLUTION INTRAVENOUS at 02:55

## 2018-01-01 RX ADMIN — HYPROMELLOSE 2910 1 DROP: 5 SOLUTION OPHTHALMIC at 16:01

## 2018-01-01 RX ADMIN — SODIUM CHLORIDE, POTASSIUM CHLORIDE, SODIUM LACTATE AND CALCIUM CHLORIDE: 600; 310; 30; 20 INJECTION, SOLUTION INTRAVENOUS at 09:14

## 2018-01-01 RX ADMIN — Medication 10 ML: at 10:28

## 2018-01-01 RX ADMIN — SODIUM CHLORIDE 100 ML/HR: 9 INJECTION, SOLUTION INTRAVENOUS at 16:19

## 2018-01-01 RX ADMIN — ASCORBIC ACID, VITAMIN A PALMITATE, CHOLECALCIFEROL, THIAMINE HYDROCHLORIDE, RIBOFLAVIN-5 PHOSPHATE SODIUM, PYRIDOXINE HYDROCHLORIDE, NIACINAMIDE, DEXPANTHENOL, ALPHA-TOCOPHEROL ACETATE, VITAMIN K1, FOLIC ACID, BIOTIN, CYANOCOBALAMIN: 200; 3300; 200; 6; 3.6; 6; 40; 15; 10; 150; 600; 60; 5 INJECTION, SOLUTION INTRAVENOUS at 22:34

## 2018-01-01 RX ADMIN — MEROPENEM 1 G: 1 INJECTION, POWDER, FOR SOLUTION INTRAVENOUS at 02:31

## 2018-01-01 RX ADMIN — PANTOPRAZOLE SODIUM 40 MG: 40 GRANULE, DELAYED RELEASE ORAL at 06:25

## 2018-01-01 RX ADMIN — METOPROLOL TARTRATE 5 MG: 5 INJECTION, SOLUTION INTRAVENOUS at 02:37

## 2018-01-01 RX ADMIN — Medication 3 ML: at 02:15

## 2018-01-01 RX ADMIN — SODIUM POLYSTYRENE SULFONATE 15 G: 15 SUSPENSION ORAL; RECTAL at 03:37

## 2018-01-01 RX ADMIN — PANTOPRAZOLE SODIUM 40 MG: 40 INJECTION, POWDER, FOR SOLUTION INTRAVENOUS at 22:42

## 2018-01-01 RX ADMIN — CLINDAMYCIN PHOSPHATE 900 MG: 18 INJECTION, SOLUTION INTRAVENOUS at 08:02

## 2018-01-01 RX ADMIN — Medication 10 ML: at 08:30

## 2018-01-01 RX ADMIN — DOCUSATE SODIUM 100 MG: 50 LIQUID ORAL at 09:03

## 2018-01-01 RX ADMIN — Medication 2 MCG/MIN: at 16:21

## 2018-01-01 RX ADMIN — Medication 10 ML: at 09:45

## 2018-01-01 RX ADMIN — PIPERACILLIN SODIUM AND TAZOBACTAM SODIUM 3.38 G: 3; .375 INJECTION, POWDER, LYOPHILIZED, FOR SOLUTION INTRAVENOUS at 23:46

## 2018-01-01 RX ADMIN — EPHEDRINE SULFATE 5 MG: 50 INJECTION, SOLUTION INTRAMUSCULAR; INTRAVENOUS; SUBCUTANEOUS at 12:19

## 2018-01-01 RX ADMIN — HYPROMELLOSE 2910 1 DROP: 5 SOLUTION OPHTHALMIC at 09:05

## 2018-01-01 RX ADMIN — PANTOPRAZOLE SODIUM 40 MG: 40 INJECTION, POWDER, FOR SOLUTION INTRAVENOUS at 09:49

## 2018-01-01 RX ADMIN — PROPOFOL 25 MCG/KG/MIN: 10 INJECTION, EMULSION INTRAVENOUS at 04:26

## 2018-01-01 RX ADMIN — EPHEDRINE SULFATE 10 MG: 50 INJECTION, SOLUTION INTRAMUSCULAR; INTRAVENOUS; SUBCUTANEOUS at 12:11

## 2018-01-01 RX ADMIN — Medication 15 ML: at 09:03

## 2018-01-01 RX ADMIN — LIDOCAINE HYDROCHLORIDE 5 ML: 20 INJECTION, SOLUTION INFILTRATION; PERINEURAL at 16:00

## 2018-01-01 RX ADMIN — Medication 10 ML: at 09:50

## 2018-01-01 RX ADMIN — EPHEDRINE SULFATE 5 MG: 50 INJECTION, SOLUTION INTRAMUSCULAR; INTRAVENOUS; SUBCUTANEOUS at 12:30

## 2018-01-01 RX ADMIN — DEXTROSE MONOHYDRATE 25 G: 25 INJECTION, SOLUTION INTRAVENOUS at 04:31

## 2018-01-01 RX ADMIN — SODIUM CHLORIDE 1000 ML: 9 INJECTION, SOLUTION INTRAVENOUS at 02:55

## 2018-01-01 RX ADMIN — Medication 0.1 MG: at 12:01

## 2018-01-01 RX ADMIN — Medication 35 MCG/MIN: at 04:49

## 2018-01-01 RX ADMIN — PIPERACILLIN SODIUM AND TAZOBACTAM SODIUM 3.38 G: 3; .375 INJECTION, POWDER, LYOPHILIZED, FOR SOLUTION INTRAVENOUS at 16:10

## 2018-01-01 RX ADMIN — PROPOFOL 10 MG: 10 INJECTION, EMULSION INTRAVENOUS at 12:15

## 2018-01-01 RX ADMIN — PIPERACILLIN SODIUM AND TAZOBACTAM SODIUM 3.38 G: 3; .375 INJECTION, POWDER, LYOPHILIZED, FOR SOLUTION INTRAVENOUS at 21:40

## 2018-01-01 RX ADMIN — HYPROMELLOSE 2910 1 DROP: 5 SOLUTION OPHTHALMIC at 08:05

## 2018-01-01 RX ADMIN — DEXTROSE MONOHYDRATE: 50 INJECTION, SOLUTION INTRAVENOUS at 05:12

## 2018-01-01 RX ADMIN — SODIUM CHLORIDE, POTASSIUM CHLORIDE, SODIUM LACTATE AND CALCIUM CHLORIDE 500 ML: 600; 310; 30; 20 INJECTION, SOLUTION INTRAVENOUS at 09:48

## 2018-01-01 RX ADMIN — ROCURONIUM BROMIDE 20 MG: 10 INJECTION INTRAVENOUS at 12:01

## 2018-01-01 RX ADMIN — HYPROMELLOSE 2910 1 DROP: 5 SOLUTION OPHTHALMIC at 17:00

## 2018-01-01 RX ADMIN — EPHEDRINE SULFATE 5 MG: 50 INJECTION, SOLUTION INTRAMUSCULAR; INTRAVENOUS; SUBCUTANEOUS at 12:32

## 2018-01-01 RX ADMIN — Medication 0.1 MG: at 11:12

## 2018-01-01 RX ADMIN — Medication 10 ML: at 21:00

## 2018-01-01 RX ADMIN — PHENYLEPHRINE HYDROCHLORIDE 50 MCG/MIN: 10 INJECTION INTRAVENOUS at 11:01

## 2018-01-01 RX ADMIN — Medication 15 ML: at 10:27

## 2018-01-01 RX ADMIN — SODIUM CHLORIDE, POTASSIUM CHLORIDE, SODIUM LACTATE AND CALCIUM CHLORIDE: 600; 310; 30; 20 INJECTION, SOLUTION INTRAVENOUS at 12:50

## 2018-01-01 RX ADMIN — Medication 10 ML: at 20:41

## 2018-01-01 RX ADMIN — LIDOCAINE HYDROCHLORIDE 50 MG: 20 INJECTION, SOLUTION INFILTRATION; PERINEURAL at 11:02

## 2018-01-01 RX ADMIN — PIPERACILLIN SODIUM AND TAZOBACTAM SODIUM 3.38 G: 3; .375 INJECTION, POWDER, LYOPHILIZED, FOR SOLUTION INTRAVENOUS at 22:20

## 2018-01-01 RX ADMIN — CITALOPRAM HYDROBROMIDE 20 MG: 20 TABLET ORAL at 10:27

## 2018-01-01 RX ADMIN — Medication 0.2 MG: at 11:01

## 2018-01-01 RX ADMIN — DEXTROSE MONOHYDRATE: 50 INJECTION, SOLUTION INTRAVENOUS at 11:58

## 2018-01-01 RX ADMIN — Medication 15 ML: at 08:03

## 2018-01-01 RX ADMIN — Medication 10 ML: at 01:11

## 2018-01-01 RX ADMIN — PROPOFOL 30 MG: 10 INJECTION, EMULSION INTRAVENOUS at 12:09

## 2018-01-01 RX ADMIN — METOPROLOL TARTRATE 5 MG: 5 INJECTION, SOLUTION INTRAVENOUS at 04:35

## 2018-01-01 RX ADMIN — Medication 15 ML: at 21:00

## 2018-01-01 RX ADMIN — Medication 15 ML: at 09:54

## 2018-01-01 RX ADMIN — POTASSIUM PHOSPHATE, MONOBASIC AND POTASSIUM PHOSPHATE, DIBASIC: 224; 236 INJECTION, SOLUTION INTRAVENOUS at 11:18

## 2018-01-01 RX ADMIN — SODIUM CHLORIDE, POTASSIUM CHLORIDE, SODIUM LACTATE AND CALCIUM CHLORIDE: 600; 310; 30; 20 INJECTION, SOLUTION INTRAVENOUS at 11:50

## 2018-01-01 RX ADMIN — DIMETHICONE, CAMPHOR (SYNTHETIC), MENTHOL, AND PHENOL: 1.1; .5; .625; .5 OINTMENT TOPICAL at 16:16

## 2018-01-01 RX ADMIN — SENNOSIDES AND DOCUSATE SODIUM 2 TABLET: 8.6; 5 TABLET ORAL at 09:03

## 2018-01-01 RX ADMIN — EPHEDRINE SULFATE 10 MG: 50 INJECTION, SOLUTION INTRAMUSCULAR; INTRAVENOUS; SUBCUTANEOUS at 11:00

## 2018-01-01 RX ADMIN — SODIUM CHLORIDE 250 ML: 9 INJECTION, SOLUTION INTRAVENOUS at 16:02

## 2018-01-01 RX ADMIN — METOPROLOL TARTRATE 5 MG: 5 INJECTION, SOLUTION INTRAVENOUS at 03:21

## 2018-01-01 RX ADMIN — Medication 10 ML: at 08:06

## 2018-01-01 RX ADMIN — Medication 4 MG: at 13:09

## 2018-01-01 RX ADMIN — SODIUM CHLORIDE: 9 INJECTION, SOLUTION INTRAVENOUS at 15:20

## 2018-01-01 RX ADMIN — SODIUM CHLORIDE, POTASSIUM CHLORIDE, SODIUM LACTATE AND CALCIUM CHLORIDE 500 ML: 600; 310; 30; 20 INJECTION, SOLUTION INTRAVENOUS at 09:05

## 2018-01-01 RX ADMIN — PIPERACILLIN SODIUM AND TAZOBACTAM SODIUM 3.38 G: 3; .375 INJECTION, POWDER, LYOPHILIZED, FOR SOLUTION INTRAVENOUS at 06:23

## 2018-01-01 RX ADMIN — SODIUM CHLORIDE, PRESERVATIVE FREE 10 ML: 5 INJECTION INTRAVENOUS at 09:51

## 2018-01-01 RX ADMIN — EPHEDRINE SULFATE 5 MG: 50 INJECTION, SOLUTION INTRAMUSCULAR; INTRAVENOUS; SUBCUTANEOUS at 11:49

## 2018-01-01 RX ADMIN — SODIUM CHLORIDE, POTASSIUM CHLORIDE, SODIUM LACTATE AND CALCIUM CHLORIDE 500 ML: 600; 310; 30; 20 INJECTION, SOLUTION INTRAVENOUS at 17:33

## 2018-01-01 RX ADMIN — PIPERACILLIN SODIUM AND TAZOBACTAM SODIUM 3.38 G: 3; .375 INJECTION, POWDER, LYOPHILIZED, FOR SOLUTION INTRAVENOUS at 14:50

## 2018-01-01 RX ADMIN — Medication 15 ML: at 11:09

## 2018-01-01 RX ADMIN — CLINDAMYCIN PHOSPHATE 900 MG: 18 INJECTION, SOLUTION INTRAVENOUS at 00:00

## 2018-01-01 RX ADMIN — MEROPENEM 1 G: 1 INJECTION, POWDER, FOR SOLUTION INTRAVENOUS at 18:59

## 2018-01-01 RX ADMIN — WATER: 1 INJECTION INTRAMUSCULAR; INTRAVENOUS; SUBCUTANEOUS at 02:45

## 2018-01-01 RX ADMIN — HYPROMELLOSE 2910 1 DROP: 5 SOLUTION OPHTHALMIC at 09:55

## 2018-01-01 RX ADMIN — SODIUM CHLORIDE, POTASSIUM CHLORIDE, SODIUM LACTATE AND CALCIUM CHLORIDE: 600; 310; 30; 20 INJECTION, SOLUTION INTRAVENOUS at 10:53

## 2018-01-01 RX ADMIN — DEXTROSE AND SODIUM CHLORIDE: 5; 450 INJECTION, SOLUTION INTRAVENOUS at 14:07

## 2018-01-01 RX ADMIN — Medication 15 ML: at 21:16

## 2018-01-01 RX ADMIN — EPHEDRINE SULFATE 5 MG: 50 INJECTION, SOLUTION INTRAMUSCULAR; INTRAVENOUS; SUBCUTANEOUS at 12:15

## 2018-01-01 RX ADMIN — INSULIN HUMAN 10 UNITS: 100 INJECTION, SOLUTION PARENTERAL at 04:20

## 2018-01-01 RX ADMIN — HYPROMELLOSE 2910 1 DROP: 5 SOLUTION OPHTHALMIC at 20:38

## 2018-01-01 RX ADMIN — CLINDAMYCIN PHOSPHATE 900 MG: 18 INJECTION, SOLUTION INTRAVENOUS at 16:31

## 2018-01-01 RX ADMIN — ASCORBIC ACID, VITAMIN A PALMITATE, CHOLECALCIFEROL, THIAMINE HYDROCHLORIDE, RIBOFLAVIN-5 PHOSPHATE SODIUM, PYRIDOXINE HYDROCHLORIDE, NIACINAMIDE, DEXPANTHENOL, ALPHA-TOCOPHEROL ACETATE, VITAMIN K1, FOLIC ACID, BIOTIN, CYANOCOBALAMIN: 200; 3300; 200; 6; 3.6; 6; 40; 15; 10; 150; 600; 60; 5 INJECTION, SOLUTION INTRAVENOUS at 22:04

## 2018-01-01 RX ADMIN — ROCURONIUM BROMIDE 50 MG: 10 INJECTION INTRAVENOUS at 11:06

## 2018-01-01 RX ADMIN — CLINDAMYCIN PHOSPHATE 900 MG: 18 INJECTION, SOLUTION INTRAVENOUS at 16:19

## 2018-01-01 RX ADMIN — DEXTROSE MONOHYDRATE: 50 INJECTION, SOLUTION INTRAVENOUS at 18:59

## 2018-01-01 RX ADMIN — COLLAGENASE SANTYL: 250 OINTMENT TOPICAL at 12:37

## 2018-01-01 RX ADMIN — EPHEDRINE SULFATE 10 MG: 50 INJECTION, SOLUTION INTRAMUSCULAR; INTRAVENOUS; SUBCUTANEOUS at 12:08

## 2018-01-01 RX ADMIN — Medication 4 MG: at 17:07

## 2018-01-01 RX ADMIN — Medication 3 ML: at 10:00

## 2018-01-01 RX ADMIN — HYPROMELLOSE 2910 1 DROP: 5 SOLUTION OPHTHALMIC at 13:45

## 2018-01-01 RX ADMIN — DOCUSATE SODIUM 100 MG: 50 LIQUID ORAL at 21:40

## 2018-01-01 RX ADMIN — ENOXAPARIN SODIUM 40 MG: 40 INJECTION, SOLUTION INTRAVENOUS; SUBCUTANEOUS at 09:13

## 2018-01-01 RX ADMIN — SODIUM CHLORIDE: 9 INJECTION, SOLUTION INTRAVENOUS at 08:46

## 2018-01-01 RX ADMIN — POLYETHYLENE GLYCOL 3350 17 G: 17 POWDER, FOR SOLUTION ORAL at 09:04

## 2018-01-01 RX ADMIN — HYPROMELLOSE 2910 1 DROP: 5 SOLUTION OPHTHALMIC at 21:50

## 2018-01-01 RX ADMIN — Medication 0.2 MG: at 11:02

## 2018-01-01 RX ADMIN — COLLAGENASE SANTYL: 250 OINTMENT TOPICAL at 13:50

## 2018-01-01 RX ADMIN — PIPERACILLIN SODIUM AND TAZOBACTAM SODIUM 3.38 G: 3; .375 INJECTION, POWDER, LYOPHILIZED, FOR SOLUTION INTRAVENOUS at 16:30

## 2018-01-01 RX ADMIN — Medication 0.2 MG: at 11:59

## 2018-01-01 RX ADMIN — SODIUM CHLORIDE, POTASSIUM CHLORIDE, SODIUM LACTATE AND CALCIUM CHLORIDE: 600; 310; 30; 20 INJECTION, SOLUTION INTRAVENOUS at 19:45

## 2018-01-01 RX ADMIN — DOCUSATE SODIUM 100 MG: 50 LIQUID ORAL at 10:27

## 2018-01-01 RX ADMIN — PROPOFOL 100 MG: 10 INJECTION, EMULSION INTRAVENOUS at 11:02

## 2018-01-01 RX ADMIN — HYPROMELLOSE 2910 1 DROP: 5 SOLUTION OPHTHALMIC at 21:43

## 2018-01-01 RX ADMIN — Medication 64.96 MCG/MIN: at 10:01

## 2018-01-01 RX ADMIN — PIPERACILLIN SODIUM AND TAZOBACTAM SODIUM 3.38 G: 3; .375 INJECTION, POWDER, LYOPHILIZED, FOR SOLUTION INTRAVENOUS at 01:06

## 2018-01-01 RX ADMIN — SODIUM CHLORIDE, POTASSIUM CHLORIDE, SODIUM LACTATE AND CALCIUM CHLORIDE 1000 ML: 600; 310; 30; 20 INJECTION, SOLUTION INTRAVENOUS at 17:23

## 2018-01-01 RX ADMIN — SODIUM CHLORIDE, POTASSIUM CHLORIDE, SODIUM LACTATE AND CALCIUM CHLORIDE: 600; 310; 30; 20 INJECTION, SOLUTION INTRAVENOUS at 11:15

## 2018-01-01 RX ADMIN — Medication 2 MG: at 02:27

## 2018-01-01 RX ADMIN — SODIUM CHLORIDE: 9 INJECTION, SOLUTION INTRAVENOUS at 06:33

## 2018-01-01 RX ADMIN — DOCUSATE SODIUM 100 MG: 50 LIQUID ORAL at 08:03

## 2018-01-01 RX ADMIN — AZITHROMYCIN MONOHYDRATE 500 MG: 500 INJECTION, POWDER, LYOPHILIZED, FOR SOLUTION INTRAVENOUS at 03:54

## 2018-01-01 RX ADMIN — Medication 4 MG: at 14:57

## 2018-01-01 RX ADMIN — PANTOPRAZOLE SODIUM 40 MG: 40 GRANULE, DELAYED RELEASE ORAL at 09:04

## 2018-01-01 RX ADMIN — CALCIUM GLUCONATE 1 G: 94 INJECTION, SOLUTION INTRAVENOUS at 03:57

## 2018-01-01 RX ADMIN — CEFTRIAXONE 1 G: 1 INJECTION, POWDER, FOR SOLUTION INTRAMUSCULAR; INTRAVENOUS at 03:39

## 2018-01-01 RX ADMIN — LEVOFLOXACIN 250 MG: 5 INJECTION, SOLUTION INTRAVENOUS at 13:19

## 2018-01-01 RX ADMIN — ACETAMINOPHEN 650 MG: 325 SOLUTION ORAL at 05:17

## 2018-01-01 RX ADMIN — MIDAZOLAM HYDROCHLORIDE 2 MG: 1 INJECTION, SOLUTION INTRAMUSCULAR; INTRAVENOUS at 10:20

## 2018-01-01 RX ADMIN — PIPERACILLIN SODIUM AND TAZOBACTAM SODIUM 3.38 G: 3; .375 INJECTION, POWDER, LYOPHILIZED, FOR SOLUTION INTRAVENOUS at 20:16

## 2018-01-01 RX ADMIN — SODIUM CHLORIDE: 9 INJECTION, SOLUTION INTRAVENOUS at 03:57

## 2018-01-01 RX ADMIN — Medication 15 ML: at 21:40

## 2018-01-01 RX ADMIN — Medication 2 MG: at 09:56

## 2018-01-01 RX ADMIN — EPHEDRINE SULFATE 5 MG: 50 INJECTION, SOLUTION INTRAMUSCULAR; INTRAVENOUS; SUBCUTANEOUS at 12:00

## 2018-01-01 RX ADMIN — PIPERACILLIN SODIUM AND TAZOBACTAM SODIUM 3.38 G: 3; .375 INJECTION, POWDER, LYOPHILIZED, FOR SOLUTION INTRAVENOUS at 08:11

## 2018-01-01 RX ADMIN — PIPERACILLIN SODIUM AND TAZOBACTAM SODIUM 3.38 G: 3; .375 INJECTION, POWDER, LYOPHILIZED, FOR SOLUTION INTRAVENOUS at 06:22

## 2018-01-01 RX ADMIN — Medication 10 ML: at 01:12

## 2018-01-01 RX ADMIN — HYPROMELLOSE 2910 1 DROP: 5 SOLUTION OPHTHALMIC at 14:50

## 2018-01-01 RX ADMIN — SENNOSIDES AND DOCUSATE SODIUM 2 TABLET: 8.6; 5 TABLET ORAL at 09:48

## 2018-01-01 RX ADMIN — EPHEDRINE SULFATE 5 MG: 50 INJECTION, SOLUTION INTRAMUSCULAR; INTRAVENOUS; SUBCUTANEOUS at 12:17

## 2018-01-01 RX ADMIN — HYPROMELLOSE 2910 1 DROP: 5 SOLUTION OPHTHALMIC at 11:09

## 2018-01-01 RX ADMIN — COLLAGENASE SANTYL: 250 OINTMENT TOPICAL at 11:10

## 2018-01-01 RX ADMIN — CITALOPRAM HYDROBROMIDE 20 MG: 20 TABLET ORAL at 09:48

## 2018-01-01 RX ADMIN — CITALOPRAM HYDROBROMIDE 20 MG: 20 TABLET ORAL at 09:03

## 2018-01-01 RX ADMIN — ACETAMINOPHEN 650 MG: 325 TABLET, FILM COATED ORAL at 01:11

## 2018-01-01 RX ADMIN — CITALOPRAM HYDROBROMIDE 20 MG: 20 TABLET ORAL at 08:03

## 2018-01-01 RX ADMIN — LEVOFLOXACIN 250 MG: 5 INJECTION, SOLUTION INTRAVENOUS at 12:48

## 2018-01-01 RX ADMIN — PIPERACILLIN SODIUM AND TAZOBACTAM SODIUM 3.38 G: 3; .375 INJECTION, POWDER, LYOPHILIZED, FOR SOLUTION INTRAVENOUS at 15:14

## 2018-01-01 RX ADMIN — IOPAMIDOL 75 ML: 755 INJECTION, SOLUTION INTRAVENOUS at 14:04

## 2018-01-01 RX ADMIN — SODIUM CHLORIDE 250 ML: 9 INJECTION, SOLUTION INTRAVENOUS at 16:05

## 2018-01-01 RX ADMIN — FENTANYL CITRATE 25 MCG/HR: 50 INJECTION, SOLUTION INTRAMUSCULAR; INTRAVENOUS at 20:32

## 2018-01-01 RX ADMIN — SODIUM CHLORIDE, PRESERVATIVE FREE 10 ML: 5 INJECTION INTRAVENOUS at 22:45

## 2018-01-01 RX ADMIN — LIDOCAINE HYDROCHLORIDE 30 MG: 20 INJECTION, SOLUTION INFILTRATION; PERINEURAL at 12:09

## 2018-01-01 RX ADMIN — Medication 0.1 MG: at 11:10

## 2018-01-01 RX ADMIN — Medication 30 MG: at 11:02

## 2018-01-01 RX ADMIN — CLINDAMYCIN PHOSPHATE 900 MG: 18 INJECTION, SOLUTION INTRAVENOUS at 16:45

## 2018-01-01 RX ADMIN — Medication 0.1 MG: at 11:57

## 2018-01-01 RX ADMIN — Medication 3 ML: at 04:21

## 2018-01-01 RX ADMIN — DIMETHICONE, CAMPHOR (SYNTHETIC), MENTHOL, AND PHENOL: 1.1; .5; .625; .5 OINTMENT TOPICAL at 20:52

## 2018-01-01 RX ADMIN — CEFTRIAXONE SODIUM 1 G: 10 INJECTION, POWDER, FOR SOLUTION INTRAVENOUS at 03:53

## 2018-01-01 RX ADMIN — Medication 10 ML: at 22:23

## 2018-01-01 RX ADMIN — DEXTROSE MONOHYDRATE: 50 INJECTION, SOLUTION INTRAVENOUS at 13:50

## 2018-01-01 RX ADMIN — MEROPENEM 1 G: 1 INJECTION, POWDER, FOR SOLUTION INTRAVENOUS at 10:28

## 2018-01-01 RX ADMIN — HYPROMELLOSE 2910 1 DROP: 5 SOLUTION OPHTHALMIC at 22:53

## 2018-01-01 RX ADMIN — DOCUSATE SODIUM 100 MG: 50 LIQUID ORAL at 11:09

## 2018-01-01 RX ADMIN — CEFAZOLIN SODIUM 2 G: 2 SOLUTION INTRAVENOUS at 11:07

## 2018-01-01 RX ADMIN — EPHEDRINE SULFATE 10 MG: 50 INJECTION, SOLUTION INTRAMUSCULAR; INTRAVENOUS; SUBCUTANEOUS at 12:05

## 2018-01-01 RX ADMIN — Medication 0.1 MG: at 11:55

## 2018-01-01 RX ADMIN — CLINDAMYCIN PHOSPHATE 900 MG: 18 INJECTION, SOLUTION INTRAVENOUS at 00:33

## 2018-01-01 RX ADMIN — COLLAGENASE SANTYL: 250 OINTMENT TOPICAL at 09:55

## 2018-01-01 RX ADMIN — Medication 250 ML: at 16:02

## 2018-01-01 RX ADMIN — Medication 3 ML: at 20:50

## 2018-01-01 RX ADMIN — PIPERACILLIN SODIUM AND TAZOBACTAM SODIUM 3.38 G: 3; .375 INJECTION, POWDER, LYOPHILIZED, FOR SOLUTION INTRAVENOUS at 09:13

## 2018-01-01 RX ADMIN — POTASSIUM CHLORIDE 40 MEQ: 1500 TABLET, EXTENDED RELEASE ORAL at 10:27

## 2018-01-01 RX ADMIN — DEXAMETHASONE SODIUM PHOSPHATE 8 MG: 4 INJECTION INTRA-ARTICULAR; INTRALESIONAL; INTRAMUSCULAR; INTRAVENOUS; SOFT TISSUE at 11:34

## 2018-01-01 RX ADMIN — HYPROMELLOSE 2910 1 DROP: 5 SOLUTION OPHTHALMIC at 17:03

## 2018-01-01 RX ADMIN — Medication 0.1 MG: at 11:11

## 2018-01-01 RX ADMIN — Medication 10 ML: at 22:04

## 2018-01-01 RX ADMIN — PIPERACILLIN SODIUM AND TAZOBACTAM SODIUM 3.38 G: 3; .375 INJECTION, POWDER, LYOPHILIZED, FOR SOLUTION INTRAVENOUS at 13:45

## 2018-01-01 RX ADMIN — ONDANSETRON 5 MG: 2 INJECTION INTRAMUSCULAR; INTRAVENOUS at 12:53

## 2018-01-01 RX ADMIN — PROPOFOL 29.89 MCG/KG/MIN: 10 INJECTION, EMULSION INTRAVENOUS at 17:41

## 2018-01-01 RX ADMIN — PROPOFOL 10 MG: 10 INJECTION, EMULSION INTRAVENOUS at 12:13

## 2018-01-01 RX ADMIN — DEXTROSE AND SODIUM CHLORIDE: 5; 450 INJECTION, SOLUTION INTRAVENOUS at 05:50

## 2018-01-01 RX ADMIN — Medication 0.1 MG: at 11:00

## 2018-01-01 RX ADMIN — NOREPINEPHRINE BITARTRATE 4 MCG/KG/MIN: 1 INJECTION INTRAVENOUS at 17:40

## 2018-01-01 RX ADMIN — COLLAGENASE SANTYL: 250 OINTMENT TOPICAL at 09:04

## 2018-01-01 RX ADMIN — Medication 3 ML: at 03:55

## 2018-01-01 RX ADMIN — FOLIC ACID: 5 INJECTION, SOLUTION INTRAMUSCULAR; INTRAVENOUS; SUBCUTANEOUS at 13:48

## 2018-01-01 RX ADMIN — DEXTROSE AND SODIUM CHLORIDE: 5; 450 INJECTION, SOLUTION INTRAVENOUS at 20:36

## 2018-01-01 RX ADMIN — LEVOFLOXACIN 500 MG: 5 INJECTION, SOLUTION INTRAVENOUS at 22:03

## 2018-01-01 RX ADMIN — Medication 10 ML: at 20:49

## 2018-01-01 RX ADMIN — MORPHINE SULFATE 2 MG: 2 INJECTION, SOLUTION INTRAMUSCULAR; INTRAVENOUS at 05:17

## 2018-01-01 RX ADMIN — CLINDAMYCIN PHOSPHATE 900 MG: 18 INJECTION, SOLUTION INTRAVENOUS at 09:28

## 2018-01-01 ASSESSMENT — PULMONARY FUNCTION TESTS
PIF_VALUE: 1
PIF_VALUE: 18
PIF_VALUE: 0
PIF_VALUE: 15
PIF_VALUE: 20
PIF_VALUE: 17
PIF_VALUE: 20
PIF_VALUE: 0
PIF_VALUE: 21
PIF_VALUE: 0
PIF_VALUE: 1
PIF_VALUE: 1
PIF_VALUE: 15
PIF_VALUE: 22
PIF_VALUE: 16
PIF_VALUE: 20
PIF_VALUE: 21
PIF_VALUE: 21
PIF_VALUE: 19
PIF_VALUE: 20
PIF_VALUE: 21
PIF_VALUE: 22
PIF_VALUE: 21
PIF_VALUE: 0
PIF_VALUE: 21
PIF_VALUE: 0
PIF_VALUE: 21
PIF_VALUE: 24
PIF_VALUE: 0
PIF_VALUE: 21
PIF_VALUE: 22
PIF_VALUE: 22
PIF_VALUE: 21
PIF_VALUE: 15
PIF_VALUE: 16
PIF_VALUE: 20
PIF_VALUE: 18
PIF_VALUE: 24
PIF_VALUE: 21
PIF_VALUE: 18
PIF_VALUE: 20
PIF_VALUE: 16
PIF_VALUE: 0
PIF_VALUE: 0
PIF_VALUE: 22
PIF_VALUE: 21
PIF_VALUE: 20
PIF_VALUE: 17
PIF_VALUE: 18
PIF_VALUE: 15
PIF_VALUE: 19
PIF_VALUE: 22
PIF_VALUE: 16
PIF_VALUE: 19
PIF_VALUE: 0
PIF_VALUE: 1
PIF_VALUE: 21
PIF_VALUE: 22
PIF_VALUE: 13
PIF_VALUE: 20
PIF_VALUE: 15
PIF_VALUE: 22
PIF_VALUE: 15
PIF_VALUE: 23
PIF_VALUE: 1
PIF_VALUE: 0
PIF_VALUE: 21
PIF_VALUE: 23
PIF_VALUE: 22
PIF_VALUE: 0
PIF_VALUE: 1
PIF_VALUE: 1
PIF_VALUE: 23
PIF_VALUE: 18
PIF_VALUE: 19
PIF_VALUE: 28
PIF_VALUE: 21
PIF_VALUE: 12
PIF_VALUE: 29
PIF_VALUE: 20
PIF_VALUE: 20
PIF_VALUE: 18
PIF_VALUE: 20
PIF_VALUE: 19
PIF_VALUE: 22
PIF_VALUE: 1
PIF_VALUE: 28
PIF_VALUE: 24
PIF_VALUE: 16
PIF_VALUE: 0
PIF_VALUE: 16
PIF_VALUE: 23
PIF_VALUE: 19
PIF_VALUE: 1
PIF_VALUE: 0
PIF_VALUE: 9
PIF_VALUE: 15
PIF_VALUE: 23
PIF_VALUE: 23
PIF_VALUE: 22
PIF_VALUE: 23
PIF_VALUE: 22
PIF_VALUE: 23
PIF_VALUE: 21
PIF_VALUE: 1
PIF_VALUE: 20
PIF_VALUE: 19
PIF_VALUE: 0
PIF_VALUE: 21
PIF_VALUE: 2
PIF_VALUE: 20
PIF_VALUE: 22
PIF_VALUE: 0
PIF_VALUE: 20
PIF_VALUE: 1
PIF_VALUE: 19
PIF_VALUE: 28
PIF_VALUE: 16
PIF_VALUE: 23
PIF_VALUE: 21
PIF_VALUE: 21
PIF_VALUE: 17
PIF_VALUE: 20
PIF_VALUE: 0
PIF_VALUE: 1
PIF_VALUE: 15
PIF_VALUE: 19
PIF_VALUE: 1
PIF_VALUE: 21
PIF_VALUE: 15
PIF_VALUE: 19
PIF_VALUE: 0
PIF_VALUE: 0
PIF_VALUE: 20
PIF_VALUE: 20
PIF_VALUE: 21
PIF_VALUE: 23
PIF_VALUE: 1
PIF_VALUE: 18
PIF_VALUE: 21
PIF_VALUE: 1
PIF_VALUE: 23
PIF_VALUE: 23
PIF_VALUE: 1
PIF_VALUE: 21
PIF_VALUE: 18
PIF_VALUE: 21
PIF_VALUE: 22
PIF_VALUE: 19
PIF_VALUE: 0
PIF_VALUE: 31
PIF_VALUE: 23
PIF_VALUE: 22
PIF_VALUE: 15
PIF_VALUE: 1
PIF_VALUE: 22
PIF_VALUE: 17
PIF_VALUE: 21
PIF_VALUE: 21
PIF_VALUE: 0
PIF_VALUE: 23
PIF_VALUE: 1
PIF_VALUE: 16
PIF_VALUE: 17
PIF_VALUE: 23
PIF_VALUE: 16
PIF_VALUE: 22
PIF_VALUE: 0
PIF_VALUE: 21
PIF_VALUE: 15
PIF_VALUE: 16
PIF_VALUE: 21
PIF_VALUE: 21
PIF_VALUE: 1
PIF_VALUE: 19
PIF_VALUE: 24
PIF_VALUE: 13
PIF_VALUE: 16
PIF_VALUE: 19
PIF_VALUE: 22
PIF_VALUE: 13
PIF_VALUE: 21
PIF_VALUE: 1
PIF_VALUE: 13
PIF_VALUE: 2
PIF_VALUE: 16
PIF_VALUE: 20
PIF_VALUE: 16
PIF_VALUE: 19
PIF_VALUE: 21
PIF_VALUE: 0
PIF_VALUE: 22
PIF_VALUE: 21
PIF_VALUE: 1
PIF_VALUE: 21
PIF_VALUE: 0
PIF_VALUE: 24
PIF_VALUE: 13
PIF_VALUE: 15
PIF_VALUE: 23
PIF_VALUE: 22
PIF_VALUE: 23
PIF_VALUE: 26
PIF_VALUE: 17
PIF_VALUE: 23
PIF_VALUE: 21
PIF_VALUE: 23
PIF_VALUE: 1
PIF_VALUE: 14
PIF_VALUE: 15
PIF_VALUE: 21
PIF_VALUE: 19
PIF_VALUE: 21
PIF_VALUE: 18
PIF_VALUE: 17
PIF_VALUE: 21
PIF_VALUE: 13
PIF_VALUE: 17
PIF_VALUE: 21
PIF_VALUE: 1

## 2018-01-01 ASSESSMENT — PAIN SCALES - GENERAL
PAINLEVEL_OUTOF10: 4
PAINLEVEL_OUTOF10: 0
PAINLEVEL_OUTOF10: 1
PAINLEVEL_OUTOF10: 0
PAINLEVEL_OUTOF10: 10
PAINLEVEL_OUTOF10: 0
PAINLEVEL_OUTOF10: 4
PAINLEVEL_OUTOF10: 0
PAINLEVEL_OUTOF10: 3
PAINLEVEL_OUTOF10: 4
PAINLEVEL_OUTOF10: 0
PAINLEVEL_OUTOF10: 4
PAINLEVEL_OUTOF10: 4
PAINLEVEL_OUTOF10: 3
PAINLEVEL_OUTOF10: 0
PAINLEVEL_OUTOF10: 4
PAINLEVEL_OUTOF10: 0

## 2018-01-01 ASSESSMENT — PAIN DESCRIPTION - ORIENTATION: ORIENTATION: LEFT

## 2018-01-01 ASSESSMENT — PAIN DESCRIPTION - LOCATION: LOCATION: CHEST

## 2018-01-12 PROBLEM — J18.9 PNEUMONIA: Status: ACTIVE | Noted: 2018-01-01

## 2018-01-12 NOTE — ED PROVIDER NOTES
[facility] ED  eMERGENCY dEPARTMENT eNCOUnter      Pt Name: Juan A Morgan  MRN: [id number]  [place] 1950  Date of evaluation: 1/12/2018  Provider: Ignacio Millard MD    CHIEF COMPLAINT       Chief Complaint   Patient presents with    Failure To Thrive         HISTORY OF PRESENT ILLNESS   (Location/Symptom, Timing/Onset, Context/Setting, Quality, Duration, Modifying Factors, Severity)  Note limiting factors. Juan A Morgan is a 78 y.o. male who presents to the emergency department With long history of Bell's chorea diagnosed in 2009. He's been getting weaker at home and no longer able to be taken care of by his wife. He is not getting out of bed and she is not strong enough to get him out of bed. He does soil himself does drink on himself and has skin abrasions from the vince. He is a full code and wishes to be intubated if needed. He is losing weight. He understands that this is a terminal illness. His speech has deteriorated over the years but this is at baseline. His strength is less. His alertness is baseline. He is not a diabetic and doesn't smoke . There has been an occasional cough. Patient wife has said that after he fell out of bed once, he has stopped walking. Headache is denied. HPI    Nursing Notes were reviewed. REVIEW OF SYSTEMS    (2-9 systems for level 4, 10 or more for level 5)     Review of Systems     Constitutional symptoms:  no Fatigue, no fever, no chills. Skin symptoms:  Negative except as documented in HPI. ENMT symptoms:  Negative except as documented in HPI. Respiratory symptoms:  Negative except as documented in HPI. Cardiovascular symptoms:  Negative except as documented in HPI. Gastrointestinal symptoms: Negative except for documented as above in the HPI   Genitourinary symptoms:  Negative except as documented in HPI. Musculoskeletal symptoms:  Negative except as documented in HPI.    Neurologic symptoms:  Chronic Bell's vince  Remainder of 10 systems, all negative except for mentioned above      Except as noted above the remainder of the review of systems was reviewed and negative. 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  Inguinal hernia          SURGICAL HISTORY       Past Surgical History:   Procedure Laterality Date    HEMORRHOID SURGERY           CURRENT MEDICATIONS       Previous Medications    CETIRIZINE HCL (ZYRTEC ALLERGY PO)    Take by mouth    CITALOPRAM (CELEXA) 20 MG TABLET    Take 1 tablet by mouth daily.     IPRATROPIUM (ATROVENT) 0.03 % NASAL SPRAY    2 sprays by Nasal route 3 times daily    MOMETASONE (NASONEX) 50 MCG/ACT NASAL SPRAY    2 sprays by Nasal route daily    ONDANSETRON (ZOFRAN) 4 MG TABLET    Take 1 tablet by mouth every 8 hours as needed for Nausea or Vomiting    POLYETHYLENE GLYCOL (MIRALAX) PACKET    Take 17 g by mouth daily as needed for Constipation    SENNOSIDES (SENNA-LAX PO)    Take 1 tablet by mouth 2 times daily as needed        ALLERGIES     Ativan [lorazepam]    FAMILY HISTORY       Family History   Problem Relation Age of Onset    Heart Disease Father           SOCIAL HISTORY       Social History     Social History    Marital status:      Spouse name: N/A    Number of children: N/A    Years of education: N/A     Social History Main Topics    Smoking status: Never Smoker    Smokeless tobacco: Never Used    Alcohol use No    Drug use: No    Sexual activity: Not Asked     Other Topics Concern    None     Social History Narrative    None       SCREENINGS   NIH Stroke Scale  NIH Stroke Scale Assessed: NoGlasgow Coma Scale  Eye Opening: Spontaneous  Best Motor Response: Obeys commands        PHYSICAL EXAM    (up to 7 for level 4, 8 or more for level 5)     ED Triage Vitals [01/12/18 0231]   BP Temp Temp src Pulse Resp SpO2 Height

## 2018-01-12 NOTE — PROGRESS NOTES
activated  [x] Bed alarm activated    Patient Education:  Treatment goals discussed with [x]  Patient  [x]  family. The []  Patient  [x]  family understand the diagnosis, prognosis and plan of care.                  [] Reinforcement needed    Gcodes       Swallow Current  CN   Swallow Goal  CN     Severity Levels  CH - 0% Impaired or limited  CI - At least 1%, but less than 20%  CJ - At least 20%, but less than 40%  CK - At least 40%, but less than 60%  CL - At least 60%, but less than 80%  CM - At least 80%, but less than 100%  CN - 100% impaired or limited

## 2018-01-12 NOTE — PROGRESS NOTES
Hospitalist Progress Note      PCP: No primary care provider on file. Date of Admission: 2018      Subjective: no complaints    Medications:  Reviewed    Infusion Medications    sodium chloride       Scheduled Medications    sodium chloride flush  3 mL Intravenous Q8H    citalopram  20 mg Oral Daily    sodium chloride flush  10 mL Intravenous 2 times per day    enoxaparin  40 mg Subcutaneous Daily    cefTRIAXone (ROCEPHIN) IV  1 g Intravenous Q24H    And    azithromycin  500 mg Intravenous Q24H     PRN Meds: sennosides-docusate sodium, sodium chloride flush, acetaminophen, magnesium hydroxide, ondansetron      Intake/Output Summary (Last 24 hours) at 18 1413  Last data filed at 18 0449   Gross per 24 hour   Intake             1260 ml   Output                0 ml   Net             1260 ml       Exam:  BP (!) 91/57   Pulse 86   Temp 97.2 °F (36.2 °C) (Oral)   Resp 18   Ht 6' 0.99\" (1.854 m)   Wt 90 lb (40.8 kg)   SpO2 97%   BMI 11.88 kg/m²     Average, Min, and Max for last 24 hours Vitals:  TEMPERATURE:  Temp  Av.6 °F (36.4 °C)  Min: 97.2 °F (36.2 °C)  Max: 97.8 °F (36.6 °C)    RESPIRATIONS RANGE: Resp  Av  Min: 18  Max: 22    PULSE RANGE: Pulse  Av.6  Min: 77  Max: 86    BLOOD PRESSURE RANGE:  Systolic (18DEF), TBN:495 , Min:91 , UOK:955   ; Diastolic (95OJB), OUF:07, Min:57, Max:81      PULSE OXIMETRY RANGE: SpO2  Av.3 %  Min: 96 %  Max: 100 %    I/O last 3 completed shifts: In: 1260 [I.V.:1260]  Out: -     General appearance:Cachectic, ill appearing  HEENT: Pupils equal, round, and reactive to light. Conjunctivae/corneas clear. Respiratory:  Normal respiratory effort. Clear to auscultation, bilaterally without Rales/Wheezes/Rhonchi. Cardiovascular: Regular rate and rhythm with normal S1/S2 without murmurs, rubs or gallops. Abdomen: Soft, non-tender, non-distended with normal bowel sounds. Musculoskeletal: No clubbing, cyanosis or edema bilaterally.   Full

## 2018-01-12 NOTE — PLAN OF CARE
Problem: Nutrition  Goal: Optimal nutrition therapy  Outcome: Ongoing  Nutrition Problem: Severe malnutrition  Intervention: Food and/or Nutrient Delivery: Continue NPO, Start Tube Feeding (With PEG placement, recommend slow progression of tube feeding to prevent refeeding syndrome. Suggest initiate standard formula without fiber @ 20 ml/hr for 24 hours, to goal of 40ml/hr, with 1 Proteinex/day ( ~1150kcals/79gms protein). )  Nutritional Goals: EN to meet estimated needs. Weight gain. Improved renal status.

## 2018-01-12 NOTE — ED NOTES
Lab (zachary) Lactic Acid 4.2 taken at 0348 Dr. Nicole Ritter advised verbally      Minal Lopez, RN  01/12/18 7498

## 2018-01-13 PROBLEM — E43 SEVERE PROTEIN-CALORIE MALNUTRITION (HCC): Chronic | Status: ACTIVE | Noted: 2018-01-01

## 2018-01-13 PROBLEM — A41.9 SEPSIS DUE TO PNEUMONIA (HCC): Status: ACTIVE | Noted: 2018-01-01

## 2018-01-13 PROBLEM — R13.19 DYSPHAGIA CAUSING PULMONARY ASPIRATION WITH SWALLOWING: Chronic | Status: ACTIVE | Noted: 2018-01-01

## 2018-01-13 PROBLEM — F02.80 DEMENTIA ASSOCIATED WITH OTHER UNDERLYING DISEASE: Chronic | Status: ACTIVE | Noted: 2018-01-01

## 2018-01-13 PROBLEM — J18.9 SEPSIS DUE TO PNEUMONIA (HCC): Status: ACTIVE | Noted: 2018-01-01

## 2018-01-13 NOTE — PROGRESS NOTES
Renal Adjustment Per Protocol:   Levaquin 500 mg IV daily changed to Levaquin 500 mg IV once followed by Levaquin 250 mg IV daily based on    Recent Labs      01/12/18   0300   CREATININE  1.41*    CrCl cannot be calculated (Unknown ideal weight.). Calculated CrCl using total body weight: 29 mL/min    Normal renal function dosing of 500 mg daily:  CrCl 20 to 49 mL/minute: Administer 500 mg initial dose, followed by 250 mg every 24 hours. Will monitor for changes in renal function daily    Thank you,     Justin Caldwells.  Bárbara Penaloza, VaniaD  PGY-1 Pharmacy Resident  1/13/2018 12:10 PM

## 2018-01-13 NOTE — PROGRESS NOTES
nutrition supplements ordered. 8. Advance Directive: DNR-CCA   9. Severe Malnutrition with BMI 11.9: Daily weights, I and Os, dietitian on board. 10. Bowel Regimen: Dulcolax MA daily PRN hold for diarrhea or loose stools. 11. DVT prophylaxis: Lovenox  12. Discharge planning: SW on board. Will discharge once medically stable and cleared by all consultants. 13. High Risk Readmission Screening Tool Score Noted. Additionally, the following hospital problems were addressed and taken into consideration:  Principal Problem:    Sepsis due to pneumonia [facility])  Active Problems:    [place]'s chorea ([facility] [address]    Pneumonia    Severe protein-calorie malnutrition ([facility] [address]    Dysphagia causing pulmonary aspiration with swallowing    Dementia associated with other underlying disease        ** Total time spent reviewing medical records, evaluating patient, speaking with RN's and consultants where I was focused exclusively on this patient: 45 minutes. Subjective:   Admit Date: 1/12/2018  PCP: No primary care provider on file. No acute events overnight. Telemetry reviewed. Afebrile. Failed Speech eval. Has been tachycardic this Am. Objective:     Vitals:    01/12/18 1414 01/12/18 1916 01/13/18 0050 01/13/18 0404   BP: (!) 102/56 103/71 (!) 100/54 (!) 114/90   Pulse: 89 94 102 92   Resp: 17 16 16 16   Temp: 97.2 °F (36.2 °C) 98.1 °F (36.7 °C) 96.8 °F (36 °C) 98.1 °F (36.7 °C)   TempSrc: Oral Oral Axillary Oral   SpO2: 97% 94% 100% 95%   Weight:       Height:         General appearance: Mild acute distress, Arousable, (+) lethargy + O x 1. (+) temporal wasting  HEENT:  Dry buccal mucosa, trachea midline. Anicteric sclera. PEERL  Chest: No chest wall tenderness.  No use of accessory muscles  Lungs: CTAB No rales no crackles  Heart:  S1, S2 normal, RRR, no MRG appreciated  Abdomen: (+) BS, soft, (-) TTP; non distended, no guarding or rigidity noted  Extremities:  Dorsalis Pedis pulses palpable bilaterally, no cyanosis,

## 2018-01-13 NOTE — CARE COORDINATION
D/c plan  1 KOV 2 500 [facility]  Wife would like to discuss peg tube placement with Dr Gladys Arthur  Before she will sign consent,  Will continue to follow.

## 2018-01-14 NOTE — PROGRESS NOTES
Pharmacy Dose Adjustment Per Protocol    Allean Litten is a 78 y.o. male. Medication Ordered: Zosyn 3.375 g IV Q6H    Recent Labs      01/13/18   1153  01/14/18   0509   BUN  65*  58*       Recent Labs      01/13/18   1153  01/14/18   0509   CREATININE  1.10  1.08       CrCl cannot be calculated (Unknown ideal weight.). Calculated CrCl using actual body weight: 38 mL/min    Height:   Ht Readings from Last 1 Encounters:   01/12/18 6' 0.99\" (1.854 m)     Weight:  Wt Readings from Last 1 Encounters:   01/14/18 89 lb 11.6 oz (40.7 kg)         Piperacillin/Tazobactam [Zosyn]      Traditional Dosing Change to Extended Infusion:   CrCl ?20 ml/min  X Zosyn 2.25 gm q6-8 hr Zosyn 3.375 gm IV q8h, infuse over 4 hr     Zosyn 3.375 gm q6-8 hr      Zosyn 4.5 gm q6-8 hr    CrCl <20 ml/min or ESRD  Zosyn 2.25 gm q6-8 hr Zosyn 3.375 gm IV q12h, infuse over 4 hr     Zosyn 3.375 gm q6-8 hr      Zosyn 4.5 gm q6-8 hr      Thank you,  Denzil Boeck.  Ethelyn Burkitt, PharmD  PGY-1 Pharmacy Resident  1/14/2018 1:27 PM

## 2018-01-14 NOTE — PROGRESS NOTES
Peripheral Pulses: +2 palpable, equal bilaterally       Labs:   Recent Labs      01/12/18   0215  01/13/18   0539  01/14/18   0509   WBC  21.1*  14.7*  12.8*   HGB  13.6*  12.9*  12.6*   HCT  42.0  39.5*  39.3*   PLT  150  123*  101*     Recent Labs      01/12/18   0300  01/13/18   1153  01/14/18   0509   NA  146*  148*  159*   K  5.3*  3.9  4.1   CL  106  110*  121*   CO2  24  23  25   BUN  78*  65*  58*   CREATININE  1.41*  1.10  1.08   CALCIUM  8.6  8.0*  8.1*   PHOS   --   3.6   --      Recent Labs      01/12/18   0300   AST  160*   ALT  168*   BILITOT  2.0*   ALKPHOS  212*     Recent Labs      01/12/18   1859   INR  1.4     Recent Labs      01/12/18   0215   TROPONINI  0.070*       Urinalysis:    Lab Results   Component Value Date    NITRU Negative 01/12/2018    BLOODU Negative 01/12/2018    SPECGRAV 1.023 01/12/2018    GLUCOSEU Negative 01/12/2018       Radiology:  CT Chest W Contrast   Final Result   SUBOPTIMAL EXAMINATION DUE TO MOTION ARTIFACT. HUGE LOCULATED LEFT PLEURAL EFFUSION, CAUSING COMPRESSIVE ATELECTASIS OF THE LEFT LOWER LOBE AND LINGULA, AND MILD MEDIASTINAL SHIFT TO THE RIGHT. AT LEAST 3 RIGHT LOWER LUNG NODULES AND A 7 MM    INDETERMINATE RIGHT UPPER LUNG SUBPLEURAL OPACITY--RECOMMEND FOLLOW-UP CHEST CT AFTER TREATMENT (DRAINAGE) OF LEFT PLEURAL EFFUSION AND LEFT LUNG REEXPANSION, AND AT A TIME WHEN THE PATIENT CAN BE MORE COOPERATIVE, FOR BETTER EVALUATION OF LUNG NODULES. XR CHEST (SINGLE VIEW FRONTAL)   Final Result   LARGE LEFT PLEURAL EFFUSION. UNDERLYING MASS OR CONSOLIDATION IS NOT EXCLUDED. CONSIDER CHEST CT FOR FURTHER EVALUATION. NO CHANGE FROM 1/12/2018. ADDITIONAL FINDINGS AS ABOVE. XR CHEST PORTABLE   Final Result      MODERATE-SIZED LEFT PLEURAL EFFUSION AND PROBABLE ADJACENT ATELECTASIS. BRONCHOPNEUMONIA SHOULD BE EXCLUDED CLINICALLY. CT Head WO Contrast   Final Result   1. POSTERIOR SMALL SCALP HEMATOMA WITHOUT AN UNDERLYING SKULL FRACTURE.    1. Additional work up or/and treatment plan may be added today or then after based on clinical progression. I am managing a portion of pt care. Some medical issues are handled by other specialists. Additional work up and treatment should be done in out pt setting by pt PCP and other out pt providers. In addition to examining and evaluating pt, I spent additional time explaining care, normal and abnormal findings, and treatment plan. All of pt questions were answered. Counseling, diet and education were  provided. Case will be discussed with nursing staff when appropriate. Family will be updated if and when appropriate.       Diet: Diet NPO Effective Now  PN-Adult Premix 4.25/10 - Peripheral Line  PN-Adult Premix 4.25/10 - Peripheral Line    Code Status: DNR-CCA    PT/OT Eval     Electronically signed by Hua Mackenzie MD on 1/14/2018 at 4:18 PM

## 2018-01-14 NOTE — CONSULTS
89 lb 11.6 oz (40.7 kg)   SpO2 94%   BMI 11.84 kg/m²   General: Patient is  Alert, awake . constant chorea type movements in both upper and lower extremities  Head: Atraumatic , Normocephalic   Eyes: PERRL. No sclera icterus. No conjunctival injection. No discharge   ENT: No nasal  discharge. Pharynx clear. Neck:  Trachea midline. No thyromegaly, no JVD, No cervical adenopathy. Chest : Bilaterally symmetrical ,Normal effort,  No accessory muscle use  Lung : Decreased breath sounds left lung field   Heart[de-identified] Normal  rate. Regular rhythm. No mumur ,  Rub or gallop  ABD: Non-tender. Non-distended. No masses. No organmegaly. Normal bowel sounds. No hernia. EXT: No Pitting both leg , No Cyanosis No clubbing  Neuro: Constant involuntary movements of the upper extremity especially with contractures  Skin: Warm and dry. No erythema rash on exposed extremities. Data Review  Recent Labs      01/12/18   0215  01/13/18   0539  01/14/18   0509   WBC  21.1*  14.7*  12.8*   HGB  13.6*  12.9*  12.6*   HCT  42.0  39.5*  39.3*   PLT  150  123*  101*      Recent Labs      01/12/18   0300  01/13/18   1153  01/14/18   0509   NA  146*  148*  159*   K  5.3*  3.9  4.1   CL  106  110*  121*   CO2  24  23  25   BUN  78*  65*  58*   CREATININE  1.41*  1.10  1.08   GLUCOSE  94  105  155*       MV Settings: ABGs: No results for input(s): PHART, QPR0DSW, PO2ART, QVH4JUZ, BEART, B3JGCWWT, YTY6FHI in the last 72 hours. O2 Device: Nasal cannula  O2 Flow Rate (L/min): 2 L/min  Lab Results   Component Value Date    LACTA 4.2 01/12/2018       Radiology  Xr Chest (single View Frontal)    Result Date: 1/13/2018  EXAMINATION: XR CHEST LIMITED SINGLE VIEW CLINICAL HISTORY: Weakness, falls at home. COMPARISONS: 1/12/2018 FINDINGS: There is a large left lung opacity, unchanged from 1/12/2018. The configuration of the opacity suggests large pleural effusion which may be partially loculated. Underlying mass or consolidation is not excluded.

## 2018-01-14 NOTE — PROGRESS NOTES
AM assessment complete. VSS and charted, patient afebrile. Patient re positioned in bed for comfort. Assessment charted. Diminished lung sounds, hard to assess due to patient being unable to fully take a deep breath in and out. Trace edema to bilateral lower exts. Multiple wounds to body, covered with mepilex and some open to air. Will further assess wounds and re dress if needed later in my shift. Patient NPO at this time, provided mouth care. Last Bm this morning 1/14, at shift change, incontinent of stool. Vee in place, tea colored urine. NSR on tele. Will continue to monitor. Falls precautions continued. Dolphin mattress to prevent further breakdown.   Electronically signed by Lashell Martinez RN on 1/14/2018 at 8:03 AM

## 2018-01-15 NOTE — PROGRESS NOTES
Patient refusing to turn. As soon as you turn him to his right side he turns back to his left.  Electronically signed by Nacho Diana RN on 1/14/2018 at 11:36 PM

## 2018-01-15 NOTE — PROGRESS NOTES
Hospitalist Progress Note      PCP: No primary care provider on file. Date of Admission: 1/12/2018       Subjective:     No complaints, no pain    Medications:  Reviewed    Infusion Medications    PN-Adult Premix 4.25/10 - Peripheral Line 100 mL/hr at 01/14/18 2234    dextrose 100 mL/hr at 01/14/18 1859     Scheduled Medications    piperacillin-tazobactam  3.375 g Intravenous Q8H    levofloxacin  250 mg Intravenous Q24H    sodium chloride flush  3 mL Intravenous Q8H    citalopram  20 mg Oral Daily    sodium chloride flush  10 mL Intravenous 2 times per day    enoxaparin  40 mg Subcutaneous Daily     PRN Meds: sennosides-docusate sodium, sodium chloride flush, acetaminophen, magnesium hydroxide, ondansetron      Intake/Output Summary (Last 24 hours) at 01/15/18 1117  Last data filed at 01/14/18 1859   Gross per 24 hour   Intake                0 ml   Output              810 ml   Net             -810 ml       Exam:    /75   Pulse 107   Temp 98.1 °F (36.7 °C) (Axillary)   Resp 20   Ht 6' 0.99\" (1.854 m)   Wt 89 lb 11.6 oz (40.7 kg)   SpO2 97%   BMI 11.84 kg/m²     General appearance: No apparent distress, appears stated age and uncooperative. HEENT: Pupils equal, round, and reactive to light. Conjunctivae/corneas clear. Neck: Supple, with full range of motion. No jugular venous distention. Trachea midline. Respiratory:  Rhochorous throughout; decreased breath sounds on the left  Cardiovascular: Regular rate and rhythm with normal S1/S2 without murmurs, rubs or gallops. Abdomen: Soft, non-tender, non-distended with normal bowel sounds. Musculoskeletal: No clubbing, cyanosis or edema bilaterally. Full range of motion without deformity. Skin: Skin color, texture, turgor normal.  No rashes or lesions.   Neuro: Non Focal.   Capillary Refill: Brisk,< 3 seconds   Peripheral Pulses: +2 palpable, equal bilaterally       Labs:   Recent Labs      01/13/18   0539  01/14/18   0509  01/15/18   0601   WBC

## 2018-01-15 NOTE — ANESTHESIA PRE PROCEDURE
Department of Anesthesiology  Preprocedure Note       Name:  Luke Alford   Age:  78 y.o.  :  1950                                          MRN:  [id number]         Date:  1/15/2018      Surgeon: Tiffanie Downing):  MD Pau Aden DO    Procedure: Procedure(s):  EGD ESOPHAGOGASTRODUODENOSCOPY PEG TUBE INSERTION  EGD ESOPHAGOGASTRODUODENOSCOPY    Medications prior to admission:   Prior to Admission medications    Medication Sig Start Date End Date Taking? Authorizing Provider   polyethylene glycol (MIRALAX) packet Take 17 g by mouth daily as needed for Constipation 16  Yes Alli Murdock MD   Sennosides (SENNA-LAX PO) Take 1 tablet by mouth 2 times daily as needed    Yes Historical Provider, MD   Cetirizine HCl (ZYRTEC ALLERGY PO) Take by mouth    Historical Provider, MD   ondansetron (ZOFRAN) 4 MG tablet Take 1 tablet by mouth every 8 hours as needed for Nausea or Vomiting 16   Esther Casiano MD   ipratropium (ATROVENT) 0.03 % nasal spray 2 sprays by Nasal route 3 times daily 16  Esther Casiano MD   mometasone (NASONEX) 50 MCG/ACT nasal spray 2 sprays by Nasal route daily 9/9/15   Chelsey Rogel MD   citalopram (CELEXA) 20 MG tablet Take 1 tablet by mouth daily.  12   [place] Sicard, MD       Current medications:    Current Facility-Administered Medications   Medication Dose Route Frequency Provider Last Rate Last Dose    piperacillin-tazobactam (ZOSYN) 3.375 g in dextrose 5 % 50 mL IVPB (mini-bag)  3.375 g Intravenous Q8H Margret Samuels MD        collagenase ointment   Topical Daily Margret Samuels MD        fentaNYL (SUBLIMAZE) injection 50 mcg  50 mcg Intravenous Q10 Min PRN Pb Dasilva MD        HYDROmorphone (DILAUDID) injection 0.5 mg  0.5 mg Intravenous Q10 Min PRN Pb Dasilva MD        HYDROcodone-acetaminophen [facility]) 5-325 MG per tablet 1 tablet  1 tablet Oral PRN Pb Dasilva MD        Or    HYDROcodone-acetaminophen [facility])

## 2018-01-15 NOTE — PLAN OF CARE
Problem: Nutrition  Goal: Optimal nutrition therapy  Outcome: Ongoing  Nutrition Problem: Severe malnutrition  Intervention: Food and/or Nutrient Delivery: Continue NPO, Start Tube Feeding, Discontinue Parenteral Nutrition ( Suggest initiate standard formula without fiber @ 20 ml/hr for 24-48 hours, to goal of 40ml/hr, with 1 Proteinex/day ( ~1150kcals/79gms protein).  D/C Clinmix once EN initiated, Begin 100 ml water every 6 hours)  Nutritional Goals: goals remain EN to deliver > 85% estimated energy/protein needs

## 2018-01-16 NOTE — PROGRESS NOTES
Orders: NPO   · Oral Diet intake: NPO  · Oral Nutrition Supplement (ONS) Orders: None  · ONS intake: NPO  · Anthropometric Measures:  · Ht: 6' 0.99\" (185.4 cm)   · Current Body Wt: 89 lb (40.4 kg) (1/14)  · Admission Body Wt: 90 lb (40.8 kg)  · Usual Body Wt: 155 lb (70.3 kg) (7-21-16)  · % Weight Change: 42%,  ~18months  · Ideal Body Wt: 178 lb (80.7 kg), % Ideal Body 51%  · BMI Classification: BMI <18.5 Underweight (11.9)  · Comparative Standards (Estimated Nutrition Needs):  · Estimated Daily Total Kcal: ~0237-0385  · Estimated Daily Protein (g): ~62-82    Estimated Intake vs Estimated Needs: Intake Less Than Needs    Nutrition Risk Level: High    Nutrition Interventions:   Continue NPO, Start Tube Feeding, Discontinue Parenteral Nutrition  Continued Inpatient Monitoring, Education Not Indicated    Nutrition Evaluation:   · Evaluation: Goals set   · Goals: goals remain EN to deliver > 85% estimated energy/protein needs    · Monitoring: TF Tolerance, Gastric Residuals, NPO Status, Weight, Pertinent Labs, Fluid Balance, Wound Healing    See Adult Nutrition Doc Flowsheet for more detail.      Electronically signed by María Juarez RD, RENE on 1/16/18 at 4:20 PM    Contact Number: [phone]

## 2018-01-16 NOTE — PROGRESS NOTES
spondylosis. LARGE LEFT PLEURAL EFFUSION. UNDERLYING MASS OR CONSOLIDATION IS NOT EXCLUDED. CONSIDER CHEST CT FOR FURTHER EVALUATION. NO CHANGE FROM 1/12/2018. ADDITIONAL FINDINGS AS ABOVE. Ct Head Wo Contrast    Result Date: 1/12/2018  EXAM: CT SCAN OF THE BRAIN WITHOUT CONTRAST COMPARISON: NONE AVAILABLE REASONS FOR EXAMINATION:     TRAUMA, HEAD INJURY IN A FALL, HEADACHE AND RIGHT EYE BRUISE TECHNIQUE:  CT brain is obtained without IV contrast agent. FINDINGS: An unenhanced CT scan of the brain demonstrates a posterior scalp hematoma without an underlying skull fracture. There is cerebral atrophy and age related findings in the brain. There is no acute CVA. There is no acute intra-axial or extra-axial findings in the brain. There is no intracranial mass, hemorrhage, \"mass effect\" or midline shift. There is mild mucosal thickening in the right maxillary sinus. The remainder of the CT scan of the brain appears unremarkable. 1. POSTERIOR SMALL SCALP HEMATOMA WITHOUT AN UNDERLYING SKULL FRACTURE. 1. 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 2. NO ACUTE INTRA-AXIAL OR EXTRA-AXIAL FINDINGS IN THE BRAIN. All CT scans at this facility use dose modulation, iterative reconstruction, and/or weight based dosing when appropriate to reduce radiation dose to as low as reasonably achievable. Ct Chest W Contrast    Result Date: 1/14/2018  EXAMINATION: CT THORAX WITH CONTRAST CLINICAL HISTORY:  ABNORMAL CHEST RADIOGRAPH COMPARISONS:  CHEST RADIOGRAPH 1/13/2018 TECHNIQUE:  Spiral scans with 75 mL  Isovue-370  IV. Multiplanar 2-D reconstructions. Axial 3-D 10 x 3 mm MIP reconstructions. All CT scans at this facility use dose modulation, iterative reconstruction, and/or weight based dosing when appropriate to reduce radiation dose to as low as reasonably achievable. FINDINGS:  Motion artifact lowers the sensitivity of this examination.  The opacity seen in the left hemithorax on chest radiograph excluded clinically. There is no right lung pulmonary infiltrate, significant right pleural effusion, cardiomegaly, vascular congestion, pneumothorax, or displaced fractures identified. MODERATE-SIZED LEFT PLEURAL EFFUSION AND PROBABLE ADJACENT ATELECTASIS. BRONCHOPNEUMONIA SHOULD BE EXCLUDED CLINICALLY. IMPRESSION AND SUGGESTION:  1. Large loculated left pleural effusion this is likely parapneumonic anaerobic empyema associated with aspiration pneumonia. Other etiologies such as malignant effusion cannot be excluded  2. 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 Severe dysphagia with suspected aspiration and empyema    CT chest report reviewed shows Madsen loculated left pleural effusion with significant atelectasis on the left side and mild mediastinal silhouette. Likely parapneumonic effusion and may have underlying empyema. I'll request consult with thoracic surgery Dr. Zoila Bull for further evaluation and treatment. Patient may need chest tube OR VATS. At this time continue IV Zosyn 3.375 g every 8 hourly.       Thank you for this consult    Electronically signed by Michael Terrazas MD, FCCP on 1/15/2018 at 7:59 PM

## 2018-01-16 NOTE — PROGRESS NOTES
Patient did not want to be turned. Moaned and rolled back the opposite way.    Electronically signed by Luzmaria Benson RN on 1/16/2018 at 2:07 AM

## 2018-01-16 NOTE — PROGRESS NOTES
[facility]  Speech Language/Pathology  Dysphagia Note    Meño Mao  1/16/2018    Time in: 0910  Time out: 0924      Pt being seen for : [x] Dysphagia exercises  [] Oral Motor Exercises             [] Therapeutic meal monitor  [] Other:    Subjective:  Behavior: [x] Alert  [] Cooperative  []  Pleasant  [] Confused  [] Agitated  [] Uncooperative  [] Distractible [] Motivated  [] Self-Limiting [] Anxious         [] Other:    Endurance:  [x] Adequate for participation in SLP sessions  [] Reduced overall              [] Lethargic  [] Other:      Objective/Assessment:     Pt unable to maintain appropriate positioning to safely trial moistened toothette or lemon glycerin swabs in oral cavity. Pt remained curled up in bed despite adjustment by SLP and RN. SLP completed labial care with moistened toothette. [x] Pt demonstrated no s/s of pain during this visit. none  N/A  [] Nursing notified    Safety Devices:  [x] Call light within reach [] Chair alarm activated         [x] Bed alarm activated    Plan:  [x] Continue as per plan of care  [] Prepare for Discharge   [] Discharge      Patient/Caregiver Education:  Treatment goals discussed with [x]  Patient  []  family. The []  Patient  []  family understand the diagnosis, prognosis and plan of care.     Signature:  Porsche Cantu, CF-SLP

## 2018-01-16 NOTE — PLAN OF CARE
Problem: Nutrition  Goal: Optimal nutrition therapy  Nutrition Problem: Severe malnutrition  Intervention: Food and/or Nutrient Delivery: Continue NPO, Start Tube Feeding, Discontinue Parenteral Nutrition  Nutritional Goals: goals remain EN to deliver > 85% estimated energy/protein needs  Outcome: Ongoing
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
## 2018-01-16 NOTE — CONSULTS
Tala Singleton [address]                       [address], [address] [facility]                                   CONSULTATION    PATIENT NAME: Patricia Call                       :        1950  MED REC NO:   [id number]                            ROOM:       Y222  ACCOUNT NO:   [de-identified]                           ADMIT DATE: 2018  PROVIDER:     Chelle Dickinson MD    CONSULT DATE:  2018    THORACIC SURGERY CONSULTATION:    HISTORY OF PRESENT ILLNESS:  Per the request of Dr. Nikolai García, this  61-year-old gentleman is seen for the potential surgical evacuation of a  large loculated left empyema - effusion. The patient was admitted to our  hospital four days ago through the emergency room with an issue regarding  weakness, which is progressively become more obvious at home and inability  to be care for by his wife. The patient in  was diagnosed with  Wythe's chorea, and he has been at home and in poor health for  considerable amount of time. He is not able to _____ on his own. He does  have ongoing issues with hygiene because of his debilitated state, and he  comes to the emergency room wishing for in spite of knowing that he has  terminal disease and that everything be done for him including for  intubation, respiratory care, and a full code status. His current  situation is that he had just a minutes ago, a PEG tube placed by Dr. Teresa Smith and Dg Martin. I have reviewed his CT scan, which shows a large  loculated left effusion with compressive atelectasis to the left lung and a  shift of the midline to the right. I have discussed this with Dr. Nikolai García  who feels that he is in need of relief, which I agreed to. Looking at his  lab values most recently today, his white count 14,300, his hemoglobin is  _____, his platelet count is only 76,000. The patient has on blood  cultures no growth. This was done on 2018.   The patient is a  candidate for a left VATS with chest tube insertion and decortication, and  we will discuss this further with his wife and plan to do this tomorrow  Wednesday, 01/17/2018. I thank Dr. Dinah Hernández and Dr. Elbert Serra for this opportunity to render an  opinion regarding the patient. The total time spent in rendering this  consultation has been 50 minutes.       Amaury Roche MD    D: 01/16/2018 12:47:46       T: 01/16/2018 15:35:59     [email or web address]  Job#: [id number]     Doc#: [id number]    CC:

## 2018-01-16 NOTE — PROGRESS NOTES
antoinette ([facility] [address] [G10]        Diet: Diet NPO Effective Now  PN-Adult Premix 4.25/10 - Peripheral Line    Code Status: DNR-CCA    PT/OT Eval     Electronically signed by Mandeep Lomas MD on 1/16/2018 at 11:03 AM

## 2018-01-16 NOTE — PROGRESS NOTES
2018 11:11 PM   Drainage Amount Scant 2018 11:11 PM   Drainage Description Serous 2018 11:11 PM   Odor None 2018 11:11 PM   Nery-wound Assessment Pink 2018 11:11 PM   Number of days: 4       Wound 18 Abrasion(s) Shoulder Right (Active)   Wound Type Wound 2018 11:11 PM   Wound Traumatic 2018 11:11 PM   Dressing Status Changed 2018 11:11 PM   Dressing Changed Changed/New 2018 11:11 PM   Dressing/Treatment Foam 2018 11:11 PM   Dressing Change Due 01/15/18 2018 11:11 PM   Wound Assessment Black; Red;Drainage 2018 11:11 PM   Drainage Amount Moderate 2018 11:11 PM   Drainage Description Serous 2018 11:11 PM   Odor None 2018 11:11 PM   Margins Undefined edges 2018 11:11 PM   Nery-wound Assessment Pink;Fragile 2018 11:11 PM   Number of days: 4       Assessment:    Patient seen at this time for multiple wounds, present on admission to the hospital -unknow if wounds are pressure related injuries or abrasions. Patient has several partial thickness wounds, to the areas including: right hip, left hip, right eye brow area, and sacrum - all areas area healing with joseph scab in place,csome with new pink tissue exposed in base of the wound. Right posterior shoulder wound is covered with some yellow fibrin/exudate, Dr. Camara Apo asked to evaluate and states wound is partial thickness and orders a santyl dressing change. Patient is on a dolphin mattress.     Plan   Plan of Care: Wound 18 Other (Comment) Coccyx stage II pressure sore-Dressing/Treatment: Foam  Wound Other (Comment) Hip Left pressure sore stage II-Dressing/Treatment: Foam  Wound 18 Other (Comment) Hip Right pressure sore stage II-Dressing/Treatment: Foam  Wound 18 Abrasion(s) Shoulder Right-Dressing/Treatment: Foam  Wound 18 Laceration Abdomen-Dressing/Treatment: Other (Comment) (mepilex)      Recommendin) pressure injury prevention interventions 2) border foam dressings to all open affected areas 3) santyl dressing to the right posterior shoulder wound / per Dr. María Fajardo.     Specialty Bed Required : Yes   [] Low Air Loss   [] Pressure Redistribution  [x] Fluid Immersion  [] Bariatric  [] Other:     Current Diet: Diet NPO Effective Now  PN-Adult Premix 4.25/10 - Peripheral Line  Diet Tube Feed Continuous/Cyclic w/ Diet  Dietician consult:  Yes    Discharge Plan:  Placement for patient upon discharge: skilled nursing    Patient appropriate for Outpatient [address]: No    Referrals:  []   [] [address] [employer] [facility]  [] Supplies  [] Other    Patient/Caregiver Teaching: pressure ulcer prevention; repositioning  Level of patient/caregiver understanding able to:   [] Indicates understanding       [] Needs reinforcement  [] Unsuccessful      [] Verbal Understanding  [] Demonstrated understanding       [x] No evidence of learning  [] Refused teaching         [] N/A       Electronically signed by PHILLIP PinzonN, RN, CWOCN

## 2018-01-17 NOTE — CONSULTS
by mouth every 8 hours as needed for Nausea or Vomiting 20 tablet 0    ipratropium (ATROVENT) 0.03 % nasal spray 2 sprays by Nasal route 3 times daily 1 Bottle 3    mometasone (NASONEX) 50 MCG/ACT nasal spray 2 sprays by Nasal route daily 1 Inhaler 3    citalopram (CELEXA) 20 MG tablet Take 1 tablet by mouth daily. 30 tablet 3       Allergies   Allergen Reactions    Ativan [Lorazepam] Other (See Comments)         Family History   Problem Relation Age of Onset    Heart Disease Father          Physical Exam:     Blood pressure 113/86, pulse 90, temperature 97.7 °F (36.5 °C), temperature source Oral, resp. rate 16, height 6' 0.99\" (1.854 m), weight 89 lb 11.6 oz (40.7 kg), SpO2 (!) 80 %. Physical Exam   HENT:   Head: Normocephalic. Neck: No JVD present. No tracheal deviation present. No thyromegaly present. Cardiovascular: Normal heart sounds and intact distal pulses. Exam reveals no gallop. No murmur heard. Pulmonary/Chest: Effort normal. No respiratory distress. He has no wheezes. He has no rales. He exhibits no tenderness. Abdominal: He exhibits no distension and no mass. There is no hepatosplenomegaly, splenomegaly or hepatomegaly. There is no tenderness. There is no rebound, no guarding and no CVA tenderness. Musculoskeletal: He exhibits no edema or tenderness. Lymphadenopathy:     He has no cervical adenopathy. He has no axillary adenopathy. Right: No inguinal, no supraclavicular and no epitrochlear adenopathy present. Left: No inguinal, no supraclavicular and no epitrochlear adenopathy present. Skin: Skin is warm. No rash noted. No erythema.      Lab Results   Component Value Date    WBC 14.3 (H) 01/16/2018    HGB 12.8 (L) 01/16/2018    HCT 40.0 (L) 01/16/2018    .4 (H) 01/16/2018    PLT 76 (L) 01/16/2018     Lab Results   Component Value Date     01/17/2018    K 3.6 01/17/2018     01/17/2018    CO2 23 01/17/2018    BUN 50 01/17/2018    CREATININE 1.00 01/17/2018    GLUCOSE 129 01/17/2018    CALCIUM 8.0 01/17/2018        CT Chest W Contrast   Final Result   SUBOPTIMAL EXAMINATION DUE TO MOTION ARTIFACT. HUGE LOCULATED LEFT PLEURAL EFFUSION, CAUSING COMPRESSIVE ATELECTASIS OF THE LEFT LOWER LOBE AND LINGULA, AND MILD MEDIASTINAL SHIFT TO THE RIGHT. AT LEAST 3 RIGHT LOWER LUNG NODULES AND A 7 MM    INDETERMINATE RIGHT UPPER LUNG SUBPLEURAL OPACITY--RECOMMEND FOLLOW-UP CHEST CT AFTER TREATMENT (DRAINAGE) OF LEFT PLEURAL EFFUSION AND LEFT LUNG REEXPANSION, AND AT A TIME WHEN THE PATIENT CAN BE MORE COOPERATIVE, FOR BETTER EVALUATION OF LUNG NODULES.       XR CHEST (SINGLE VIEW FRONTAL)   Final Result   LARGE LEFT PLEURAL EFFUSION. UNDERLYING MASS OR CONSOLIDATION IS NOT EXCLUDED. CONSIDER CHEST CT FOR FURTHER EVALUATION. NO CHANGE FROM 1/12/2018. ADDITIONAL FINDINGS AS ABOVE.              Patient Active Problem List   Diagnosis    Inguinal hernia    [place]'s chorea ([address]    Diverticulitis    Colitis, acute    Pneumonia    Sepsis due to pneumonia ([address]    Severe protein-calorie malnutrition ([address]    Dysphagia causing pulmonary aspiration with swallowing    Dementia associated with other underlying disease       ASSESSMENT:  PLAN:    Aspiration pneumonia  Empyema  Given the description of foul-smelling purulent fluid and most likely anaerobic bacteria which be consistent with aspiration pneumonia and empyema which also fits his failure to thrive picture  Continue Zosyn and discontinue other antibiotics

## 2018-01-17 NOTE — PLAN OF CARE
Problem: Falls - Risk of  Goal: Absence of falls  Outcome: Ongoing      Problem: Risk for Impaired Skin Integrity  Goal: Tissue integrity - skin and mucous membranes  Structural intactness and normal physiological function of skin and  mucous membranes. Outcome: Ongoing      Comments: Hourly rounding in place. Q2H turns.

## 2018-01-17 NOTE — ANESTHESIA PRE PROCEDURE
chloride flush 0.9 % injection 3 mL  3 mL Intravenous Q8H Shiv Stephenson MD   3 mL at 01/15/18 2050    citalopram (CELEXA) tablet 20 mg  20 mg Oral Daily Lee Ann Zhu MD        sennosides-docusate sodium (SENOKOT-S) 8.6-50 MG tablet 2 tablet  2 tablet Oral BID PRN Lee Ann Zhu MD        sodium chloride flush 0.9 % injection 10 mL  10 mL Intravenous 2 times per day Lee Ann Zhu MD   10 mL at 01/15/18 2049    sodium chloride flush 0.9 % injection 10 mL  10 mL Intravenous PRN Lee Ann Zhu MD        acetaminophen (TYLENOL) tablet 650 mg  650 mg Oral Q4H PRN Lee Ann Zhu MD        magnesium hydroxide (MILK OF MAGNESIA) 400 MG/5ML suspension 30 mL  30 mL Oral Daily PRN Lee Ann Zhu MD        ondansetron [facility]) injection 4 mg  4 mg Intravenous Q6H PRN Lee Ann Zhu MD           Allergies: Allergies   Allergen Reactions    Ativan [Lorazepam] Other (See Comments)       Problem List:    Patient Active Problem List   Diagnosis Code    Inguinal hernia K40.90    [place]'s chorea ([address] G10    Diverticulitis K57.92    Colitis, acute K52.9    Pneumonia J18.9    Sepsis due to pneumonia ([address] J18.9, A41.9    Severe protein-calorie malnutrition ([address] E43    Dysphagia causing pulmonary aspiration with swallowing R13.19    Dementia associated with other underlying disease F02.80       Past Medical History:        Diagnosis Date    Colitis, acute 8/4/2015    Diverticulitis 8/8/2015    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      Inguinal hernia        Past Surgical History:        Procedure Laterality Date    HEMORRHOID SURGERY      MI EGD PERCUTANEOUS PLACEMENT GASTROSTOMY TUBE N/A 1/16/2018    EGD ESOPHAGOGASTRODUODENOSCOPY PEG TUBE INSERTION performed by Russ Webb MD at [address] [place] [address] Rd 1/16/2018    EGD ESOPHAGOGASTRODUODENOSCOPY performed by Christie Singh DO at [facility] History:    Social History   Substance 01/12/2018       HCG (If Applicable): No results found for: PREGTESTUR, PREGSERUM, HCG, HCGQUANT     ABGs: No results found for: PHART, PO2ART, NYL2PFU, QHJ8OAX, BEART, M3BTIUWR     Type & Screen (If Applicable):  No results found for: LABABO, [address]    Anesthesia Evaluation  Patient summary reviewed and Nursing notes reviewed  Airway: Mallampati: I  TM distance: >3 FB   Neck ROM: full  Mouth opening: > = 3 FB Dental:      Comment: Poor oral care. All remaining teeth severely diseased    Pulmonary:   (+) pneumonia:                             Cardiovascular:Negative CV ROS          ECG reviewed                        Neuro/Psych:   (+) neuromuscular disease:, psychiatric history:             ROS comment: Larimer's chorea, dementia   GI/Hepatic/Renal:            ROS comment: dysphagia. Endo/Other:    (+) blood dyscrasia: anemia and thrombocytopenia:., .                  ROS comment: Severe malnutrition   Abdominal:           Vascular:                                      Anesthesia Plan      general     ASA 3       Induction: inhalational and intravenous. arterial line  MIPS: Postoperative opioids intended, Prophylactic antiemetics administered, Postoperative ventilation and One-lung ventilation. Plan discussed with CRNA.     Attending anesthesiologist reviewed and agrees with Jeff Mcclain MD   1/17/2018

## 2018-01-17 NOTE — PROGRESS NOTES
INPATIENT PROGRESS NOTES    PATIENT NAME: Kate García  MRN: [id number]  SERVICE DATE:  January 16, 2018   SERVICE TIME:  7:32 PM      PRIMARY SERVICE: Pulmonary Disease    INTERVAL HPI: Patient seen and examined at bedside, Interval Notes, orders reviewed. Nursing notes noted  Discussed Dr. Shakira Casarez, patient is going for VATS and chest tube placement tomorrow. CT chest shows large  loculated left pleural effusion causing compressive atelectasis of the left lower lobe and lingula and mild mediastinal shift to the right type III right lower lobe with intermediate nodules. He is not having any fever or chills. Currently on 2 L O2 via nasal cannula saturation 98%. Patient had peg tube tube placed  today OBJECTIVE    Body mass index is 11.84 kg/m². PHYSICAL EXAM:  Vitals:  /82   Pulse 69   Temp 98.1 °F (36.7 °C) (Oral)   Resp 14   Ht 6' 0.99\" (1.854 m)   Wt 89 lb 11.6 oz (40.7 kg)   SpO2 98%   BMI 11.84 kg/m²   General: Alert, awake . comfortable in bed, No distress. appears stated age and cooperative  Head: Atraumatic , Normocephalic   Eyes: PERRL. No sclera icterus. No conjunctival injection. No discharge   ENT: No nasal  discharge. Pharynx clear. lips, teeth, mucosa and gums are normal, tongue protrudes in the midline  Neck:  Trachea midline. No thyromegaly, no JVD, No cervical adenopathy. Chest : Bilaterally symmetrical ,Normal effort,  No accessory muscle use  Lung : .  His breath sound at left base. No Rales. No wheezing. No rhonchi. No dullness on percussion. Heart[de-identified] Normal  rate. Regular rhythm. No mumur ,  Rub or gallop  ABD: Non-tender. Non-distended. No masses. No organmegaly. Normal bowel sounds. No hernia.   Ext : No Pitting both leg , No Cyanosis No clubbing  Neuro: Constant involuntary movements of the upper extremity especially with contractures          DATA:   Recent Labs      01/15/18   0601  01/16/18   0701   WBC  14.8*  14.3*   HGB  12.6*  12.8*   HCT  39.1*  40.0*   MCV  103.7* acute fractures identified. There is mild spondylosis. LARGE LEFT PLEURAL EFFUSION. UNDERLYING MASS OR CONSOLIDATION IS NOT EXCLUDED. CONSIDER CHEST CT FOR FURTHER EVALUATION. NO CHANGE FROM 1/12/2018. ADDITIONAL FINDINGS AS ABOVE. Ct Head Wo Contrast    Result Date: 1/12/2018  EXAM: CT SCAN OF THE BRAIN WITHOUT CONTRAST COMPARISON: NONE AVAILABLE REASONS FOR EXAMINATION:     TRAUMA, HEAD INJURY IN A FALL, HEADACHE AND RIGHT EYE BRUISE TECHNIQUE:  CT brain is obtained without IV contrast agent. FINDINGS: An unenhanced CT scan of the brain demonstrates a posterior scalp hematoma without an underlying skull fracture. There is cerebral atrophy and age related findings in the brain. There is no acute CVA. There is no acute intra-axial or extra-axial findings in the brain. There is no intracranial mass, hemorrhage, \"mass effect\" or midline shift. There is mild mucosal thickening in the right maxillary sinus. The remainder of the CT scan of the brain appears unremarkable. 1. POSTERIOR SMALL SCALP HEMATOMA WITHOUT AN UNDERLYING SKULL FRACTURE. 1. 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 2. NO ACUTE INTRA-AXIAL OR EXTRA-AXIAL FINDINGS IN THE BRAIN. All CT scans at this facility use dose modulation, iterative reconstruction, and/or weight based dosing when appropriate to reduce radiation dose to as low as reasonably achievable. Ct Chest W Contrast    Result Date: 1/14/2018  EXAMINATION: CT THORAX WITH CONTRAST CLINICAL HISTORY:  ABNORMAL CHEST RADIOGRAPH COMPARISONS:  CHEST RADIOGRAPH 1/13/2018 TECHNIQUE:  Spiral scans with 75 mL  Isovue-370  IV. Multiplanar 2-D reconstructions. Axial 3-D 10 x 3 mm MIP reconstructions. All CT scans at this facility use dose modulation, iterative reconstruction, and/or weight based dosing when appropriate to reduce radiation dose to as low as reasonably achievable. FINDINGS:  Motion artifact lowers the sensitivity of this examination.  The opacity seen in the
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
## 2018-01-17 NOTE — PROGRESS NOTES
INPATIENT PROGRESS NOTES    PATIENT NAME: Vitor Aiken  MRN: [id number]  SERVICE DATE:  January 17, 2018   SERVICE TIME:  2:46 PM      PRIMARY SERVICE: Pulmonary Disease    Chief complaint pneumonia with empyema    INTERVAL HPI: Patient seen and examined at bedside, Interval Notes, orders reviewed. Nursing notes noted    Patient transferred to ICU post-VATS and chest tube placement, surgery was uneventful, currently on vent he still sedated, no apparent distress. Was on Roank-Synephrine drip however blood pressure is good and drip discontinued, lactate is slightly elevated and fluid boluses ordered. Patient unable to provide history or review of system. OBJECTIVE    Body mass index is 11.84 kg/m². PHYSICAL EXAM:  Vitals:  /86   Pulse 90   Temp 97.7 °F (36.5 °C) (Oral)   Resp 16   Ht 6' 0.99\" (1.854 m)   Wt 89 lb 11.6 oz (40.7 kg)   SpO2 (!) 80% Comment: 92, after coaching pt to breath through nose with NC  BMI 11.84 kg/m²   General: On vent, no distress   Head: Atraumatic , Normocephalic   Eyes: PERRL. No sclera icterus. No conjunctival injection. No discharge   ENT: ET tube in , moist mm   Neck:  Trachea midline. No thyromegaly, no JVD, No cervical adenopathy. Chest : Bilaterally symmetrical ,Normal effort,  No accessory muscle use  Lung : . Good air movement, fine rales at the left, no wheezing, nontender, no subcutaneous tinnitus air,   Heart[de-identified] Normal  rate. Regular rhythm. No mumur ,  Rub or gallop  ABD: Non-tender. Non-distended. No masses. No organmegaly. Normal bowel sounds. No hernia.   Ext : No Pitting both leg , No Cyanosis No clubbing  Neuro: Currently sedated          DATA:   Recent Labs      01/16/18   0701  01/17/18   1338   WBC  14.3*  19.0*   HGB  12.8*  9.3*   HCT  40.0*  28.8*   MCV  104.4*  103.8*   PLT  76*  51*     Recent Labs      01/16/18   2335  01/17/18   0325   NA  150*  148*   K  3.5  3.6   CL  115*  115*   CO2  25  23   BUN  51*  50*   CREATININE  0.98  1.00 GLUCOSE  110*  129*   CALCIUM  8.1*  8.0*   LABGLOM  >60.0  >60.0   GFRAA  >60.0  >60.0       O2 Device: Nasal cannula  O2 Flow Rate (L/min): 2 L/min    Diet NPO Effective Now     MEDICATIONS during current hospitalization:    Continuous Infusions:   dextrose 5 % and 0.45 % NaCl 125 mL/hr at 01/17/18 1407    lactated ringers      dextrose 125 mL/hr at 01/17/18 8227       Scheduled Meds:   docusate sodium  100 mg Oral BID    piperacillin-tazobactam  3.375 g Intravenous Q8H    collagenase   Topical Daily    sodium chloride flush  3 mL Intravenous Q8H    citalopram  20 mg Oral Daily    sodium chloride flush  10 mL Intravenous 2 times per day       PRN Meds:ondansetron, sennosides-docusate sodium, sodium chloride flush, acetaminophen, magnesium hydroxide, ondansetron    Radiology  Chest x-ray reviewed by me left-sided chest tube, haziness on the left, residual left pneumothorax and       IMPRESSION AND SUGGESTION:  1.  pneumonia with empyema   · Status post VATS   · Continue antibiotics per ID   · Follow-up cultures   · 500 mL LR bolus and repeat lactate      2. Shock  · Septic and hypovolemic  · Volume resuscitate  · Levophed for target mean arterial pressure 65-70  · Will place central vascular access  · Monitor lactate  · Continue antibiotics    3. Postsurgical respiratory insufficiency   · Continue vent support   · Vent setting adjusted after reviewing ABG  · Sedation with propofol if needed to target RA SS of 0   · Breathing trial tomorrow   · Lung protective strategy   · Watch for ICU delirium, TV on, daily orientation, natural light, avoid benzos, and pain control   · DVT and PUD prophylaxis , we will place SCD for now start subcutaneous heparin when okay with surgery     4. 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 Severe dysphagia status post PEG tube placement  6. Hypernatremia: Monitor for now, start tube feed later today when patient stabilizes  7.  Leukocytosis secondary to pneumonia with empyema and postoperatively: Monitor for now  Due to the immediate potential for life-threatening deterioration due to empyema and postsurgical respiratory insufficiency , I spent 40 minutes providing critical care. This time is excluding time spent performing procedures.     DW Dr Clare Irvin       Electronically signed by Karis Pinedo MD,  on 1/17/2018 at 2:46 PM

## 2018-01-18 PROBLEM — J69.0 ASPIRATION PNEUMONIA (HCC): Status: ACTIVE | Noted: 2018-01-01

## 2018-01-18 NOTE — PROGRESS NOTES
Progress Note    POD # 1    Patient is Allen Estes     The chest x-ray: Reexpansion left lung and residual parenchymal changes are noted. Chest tubes: > 500 cc output since surgery    Pain management: Does not appear to be a factor of clinical concern presently    Plan: As discussed with Dr Porfirio Agrawal, extubation is likely today.     Electronically signed by Sveta Winn MD on 1/18/2018 at 12:04 PM

## 2018-01-18 NOTE — FLOWSHEET NOTE
1945  Remains on vent calm sedated with propofol 25 mcg  Levophed at 4 mcg  Opens eyes will squeeze hands with command and move his feet. R radial artline intact good waveline  Chest tube x2 intact 12 cm chest wall draining dk bloody drainage, no air leak, no crepitus   Skin has multiple open areas Right shoulder. Right and left hips coccyx foam dressings  Right eyelid with abrasion with scab  Peg tube clamped.  jameson huggar blanket on  2200  Repositioned  Chest tubes intact draining bloody serous drainage  No crepitus noted  0000 Repositioned temp 97.7 jameson huggar blanket off   Chest tube continues to drain dark bloody serous drainage no crepitus no air leak  Tube feed started at 20 cc hr per PEG   0230 Repositioned  New foam dressings to open areas on both hips and coccyx   0600 Calm Chest tube drained 190 cc during the night   No crepitus,no air leak, oral care given tolerating tube feed

## 2018-01-18 NOTE — PROGRESS NOTES
BUN  50*  30*   CREATININE  1.00  0.68*   GLUCOSE  129*  197*   CALCIUM  8.0*  7.2*   PROT   --   3.7*   LABALBU   --   1.8*   BILITOT   --   0.6   ALKPHOS   --   107*   AST   --   73*   ALT   --   67*   LABGLOM  >60.0  >60.0   GFRAA  >60.0  >60.0   GLOB   --   1.9*       O2 Device: Ventilator  O2 Flow Rate (L/min): 2 L/min    DIET TUBE FEED CONTINUOUS/CYCLIC NPO; STANDARD WITHOUT FIBER; Gastrostomy; Continuous; 20; 20; 24     MEDICATIONS during current hospitalization:    Continuous Infusions:   lactated ringers      norepinephrine Stopped (01/18/18 0925)       Scheduled Meds:   chlorhexidine  15 mL Mouth/Throat BID    [START ON 1/19/2018] pantoprazole sodium  40 mg Oral QAM AC    docusate  100 mg Oral BID    sodium chloride flush  10 mL Intravenous 2 times per day    hypromellose  1 drop Both Eyes 4x Daily    piperacillin-tazobactam  3.375 g Intravenous Q8H    collagenase   Topical Daily    sodium chloride flush  3 mL Intravenous Q8H    citalopram  20 mg Oral Daily    sodium chloride flush  10 mL Intravenous 2 times per day       PRN Meds:polyethylene glycol, ondansetron, sodium chloride flush, sennosides-docusate sodium, sodium chloride flush, acetaminophen, magnesium hydroxide, ondansetron    Radiology  Chest x-ray reviewed by me 2 chest tubes noted, thick pleura, possible small loculated small pneumothorax in the left base smaller compared to yesterday      IMPRESSION AND SUGGESTION:  1.  pneumonia with empyema   · Status post VATS   · Continue antibiotics per ID   · Follow-up cultures         2. Shock  · Off Levophed  · Continue antibiotics    3. Postsurgical respiratory insufficiency   · Did well on breathing trial  · Extubated currently doing okay  · Continue to monitor  · Oxygen to maintain sat 90-92%  · Up in chair  · Physical therapy    4.  Thrombocytopenia  · Likely sepsis and medication related  · And possibly Zosyn  · No active bleed  · No schistocytes on peripheral blood smear  · Heparin drop started on the 15th and Lovenox was initiated on the 12th  · 4T score is 3 ( low probability)   · I will check Pf4 V however no strong indication to start argatroban at this point especially in light of his recent surgery. 7. 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 Severe dysphagia status post PEG tube placement  9. Mild hypernatremia  · Add water flushes through the feeding tube  · Monitor Na   10.  Leukocytosis secondary to pneumonia with empyema and postoperatively: Monitor for now        DW Dr Clary Morales       Electronically signed by Cj Barajas MD,  on 1/18/2018 at 12:50 PM

## 2018-01-18 NOTE — OP NOTE
[address]                       [address], [address] [facility]                                 OPERATIVE REPORT    PATIENT NAME: Day Maya                       :        1950  MED REC NO:   [id number]                            ROOM:       [id number]  ACCOUNT NO:   [de-identified]                           ADMIT DATE: 2018  PROVIDER:     Modesta Kim MD    DATE OF PROCEDURE:  2018    PREOPERATIVE DIAGNOSES:  Failure to thrive and Menasha chorea. POSTOPERATIVE DIAGNOSES:  Failure to thrive and Menasha chorea. OPERATION PERFORMED:  Placement of a percutaneous gastrostomy tube for  feeding purpose. SURGEON:  Modesta Kim MD.    INDICATION:  This is a 15-year-old male patient who was recently admitted  because of the dehydration and cachectic looking-like, and has been unable  to have a good nutrient. Otherwise, the patient is stable. After the  discussion with the wife about the request about putting gastrostomy tube  for supplemental nutrition, she requested to go ahead and proceed with the  procedure. Otherwise, the abdomen is soft, no tenderness, and no  distention. OPERATIVE PROCEDURE:  With the patient under MAC type of anesthesia in the  supine position and after the open endoscopy was performed by Dr. Mary eLa,  which was essentially normal except for the peptic ulcer disease, after  procedure was completed, the abdomen immediately was prepped and draped in  the usual manner. The selection for placement of the gastrostomy tube was  made with the help of the transillumination from the scope and the selected  area was just in the midepigastrium.     Immediately, the skin was infiltrated with 0.25% Marcaine with epinephrine  and a small stab wound was made through which an Angiocath was inserted  directly into the lumen of the stomach after the stomach was insufflated by  the endoscopist.  Through the Angiocath, a #1 string was inserted
accomplished  through a standard thoracoscopic port, we viewed the left pleural cavity  and found a white fibrinous cover to the left lung, binding down the  re-expansion of the lung, which we had preferentially deflated with our  double-lumen tube to begin the procedure. We performed whatever  decortication we could on the surface of the visceral pleura. We then  opened and divided the major fissure on the left side to allow for the  proper re-expansion of the left upper and lower lobes from the fissure  aspect and the tissue not involved on the superior and medial sides of the  lung by the empyema cavity. We then re-inflated with hand ventilation the  lung and we also added 5 cm of PEEP, which we were then able to see  re-expand the lung. We then instilled 1000 mL of saline with 2 g of Ancef  to irrigate out the cavity under thoracoscopic view. We drained the  pleural cavity with a 32-Spanish straight tube which was directed apically  and a 32-Spanish right angle tube, which was directed into the lower gutter  of the left lung field. These were anchored down to the incisions in  standard fashion with silk suture and the remaining portions of the small  thoracoscopic incisions, which they were two, were closed with Vicryl and  then silk running suture. The tubes were then connected to atrium suction. The patient was positioned in the supine manner and was extubated. He was  reintubated with a single-lumen endotracheal tube and using the Olympus  fiberoptic bronchoscope, introducing through the sideport of the tube, we  performed a therapeutic bronchoscopy removing thick secretions from the  upper tracheal airway and down into the left endobronchial tree. When it  was clear to our satisfaction, the patient was ready for transfer and he  went directly from OR to bed 5 of CV ICU. The sponge, instrument, and  needle counts were correct x2. The estimated blood loss was no more than  100 mL.   We telephoned his

## 2018-01-18 NOTE — PLAN OF CARE
Problem: Falls - Risk of  Goal: Absence of falls  Outcome: Ongoing      Problem: Risk for Impaired Skin Integrity  Goal: Tissue integrity - skin and mucous membranes  Structural intactness and normal physiological function of skin and  mucous membranes.    Outcome: Ongoing      Problem: IP SWALLOWING  Goal: LTG - patient will tolerate the least restrictive diet consistency to allow for safe consumption of daily meals  Outcome: Ongoing      Problem: Nutrition  Goal: Optimal nutrition therapy  Nutrition Problem: Severe malnutrition  Intervention: Food and/or Nutrient Delivery: Continue NPO, Start Tube Feeding, Discontinue Parenteral Nutrition  Nutritional Goals: goals remain EN to deliver > 85% estimated energy/protein needs   Outcome: Ongoing      Problem: Restraint Use - Nonviolent/Non-Self-Destructive Behavior:  Goal: Absence of restraint indications  Absence of restraint indications   Outcome: Ongoing    Goal: Absence of restraint-related injury  Absence of restraint-related injury   Outcome: Ongoing      Problem: Breathing Pattern - Ineffective:  Goal: Ability to achieve and maintain a regular respiratory rate will improve  Ability to achieve and maintain a regular respiratory rate will improve  Outcome: Ongoing

## 2018-01-18 NOTE — PROGRESS NOTES
1/17/2018  9:00 AM   Wound Assessment Black; Red;Drainage 1/12/2018 11:11 PM   Drainage Amount Moderate 1/12/2018 11:11 PM   Drainage Description Serous 1/12/2018 11:11 PM   Odor None 1/12/2018 11:11 PM   Margins Undefined edges 1/12/2018 11:11 PM   Nery-wound Assessment Pink;Fragile 1/12/2018 11:11 PM   Number of days: 6       Incision 01/17/18 Chest Left (Active)   Wound Assessment Clean;Dry; Intact 1/18/2018  7:30 AM   Nery-wound Assessment Clean;Dry; Intact 1/18/2018  7:30 AM   Closure Open to air 1/18/2018  7:30 AM   Drainage Amount None 1/18/2018  7:30 AM   Odor None 1/18/2018  7:30 AM   Dressing/Treatment Open to air 1/18/2018  7:30 AM   Dressing Status Clean;Dry; Intact 1/18/2018  7:30 AM   Number of days: 0       Assessment:    Patient previously seen by this [id number] Nurse less that 48 hrs ago, for evaluation of several pressure injuries that were present on admission to the hospital. Per Avtar Arriaza RN, Nursing was unsure if patient was evaluated by this [id number] Nurse or not, so consultation was ordered. Per Avtar Arriaza RN, performed skin assessment this morning, stage 2 pressure injury reported to bilateral hips, sacrum, and right eye brow - superficial, partially scabbed and healing - being treated with border foam dressings. Right posterior shoulder is being managed with Santyl dressing change daily, ordered by Dr. Fabiana Soriano. No change is wounds - will continue to follow as needed. Continue with current plan.  Pressure injury prevention interventions are in place    Plan:   Plan of Care: Wound 01/12/18 Other (Comment) Coccyx stage II pressure sore-Dressing/Treatment: Foam  Wound Other (Comment) Hip Left pressure sore stage II-Dressing/Treatment: Foam  Wound 01/12/18 Other (Comment) Hip Right pressure sore stage II-Dressing/Treatment: Foam  Wound 01/12/18 Abrasion(s) Shoulder Right-Dressing/Treatment: Wound gel, Foam  Incision 01/17/18 Chest Left-Dressing/Treatment: Open to air  Wound 01/12/18 Laceration

## 2018-01-18 NOTE — PROGRESS NOTES
SUBOPTIMAL EXAMINATION DUE TO MOTION ARTIFACT. HUGE LOCULATED LEFT PLEURAL EFFUSION, CAUSING COMPRESSIVE ATELECTASIS OF THE LEFT LOWER LOBE AND LINGULA, AND MILD MEDIASTINAL SHIFT TO THE RIGHT. AT LEAST 3 RIGHT LOWER LUNG NODULES AND A 7 MM    INDETERMINATE RIGHT UPPER LUNG SUBPLEURAL OPACITY--RECOMMEND FOLLOW-UP CHEST CT AFTER TREATMENT (DRAINAGE) OF LEFT PLEURAL EFFUSION AND LEFT LUNG REEXPANSION, AND AT A TIME WHEN THE PATIENT CAN BE MORE COOPERATIVE, FOR BETTER EVALUATION OF LUNG NODULES. XR CHEST (SINGLE VIEW FRONTAL)   Final Result   LARGE LEFT PLEURAL EFFUSION. UNDERLYING MASS OR CONSOLIDATION IS NOT EXCLUDED. CONSIDER CHEST CT FOR FURTHER EVALUATION. NO CHANGE FROM 1/12/2018. ADDITIONAL FINDINGS AS ABOVE. XR CHEST PORTABLE   Final Result      MODERATE-SIZED LEFT PLEURAL EFFUSION AND PROBABLE ADJACENT ATELECTASIS. BRONCHOPNEUMONIA SHOULD BE EXCLUDED CLINICALLY. CT Head WO Contrast   Final Result   1. POSTERIOR SMALL SCALP HEMATOMA WITHOUT AN UNDERLYING SKULL FRACTURE. 1. 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 2. NO ACUTE INTRA-AXIAL OR EXTRA-AXIAL FINDINGS IN THE BRAIN. All CT scans at this facility use dose modulation, iterative reconstruction, and/or weight based dosing when appropriate to reduce radiation dose to as low as reasonably achievable. XR CHEST PORTABLE    (Results Pending)           Assessment/Plan:    1. Severe Sepsis with Lactic Acidosis, end organ dysfunction (IMER), hypotension most likely due to aspiration PNA present on admission: Large left sided pleural effusion concerning Status post chest tube and placement -continue zosyn Appreciate cardiothoracic surgery and infectious disease input. 2. Acute Renal Failure, likely due to sepsis and dehydration resolved:  Monitor renal function closely. 3. Severe malnutrition due to severe dysphagia likely will need PEG tube:  Gen Sx on board.  Continue clinimix;  PEG tube  Today

## 2018-01-18 NOTE — PROGRESS NOTES
Physical Therapy   Facility/Department: [facility] MED SURG IC05/IC05-01    NAME: Sarah Marino    : 1950 (78 y.o.)  MRN: [id number]    Account: [de-identified]  Gender: male    PT evaluation and treatment orders received. Chart reviewed. PT eval attempted. Hold PT eval: pt recently extubated. Will follow and check back tomorrow. Will attempt PT evaluation again at earliest convenience.         Electronically signed by Ariela Avila PT on 18 at 4:39 PM

## 2018-01-18 NOTE — PROGRESS NOTES
Nutrition Assessment    Type and Reason for Visit: Reassess, Consult (RD To Order and Mange Tube Feeds)    Nutrition Recommendations:     INCREASE STANDARD Without FIBER to 30 ML/HR  ( increase 10 ml in 12 hours) to GOAL RATE of 40 ML/HR ),  With 100 ML Water Flush Every 4 Hours ( per physician request 1/18/18 ). WOUND HEALING SUPPLEMENT- BID ( mixed with 100 ml Water )-  Via Peg Tube. Malnutrition Assessment:  · Malnutrition Status: Meets the criteria for severe malnutrition  · Context: Chronic illness  · Findings of the 6 clinical characteristics of malnutrition (Minimum of 2 out of 6 clinical characteristics is required to make the diagnosis of moderate or severe Protein Calorie Malnutrition based on AND/ASPEN Guidelines):  1. Energy Intake-Less than or equal to 50%, greater than 7 days    2. Weight Loss-20% loss or greater,  (~18months)  3. Fat Loss-Severe subcutaneous fat loss, Orbital, Triceps, Fat overlying the ribs  4. Muscle Loss-Severe muscle mass loss, Temples (temporalis muscle), Clavicles (pectoralis and deltoids), Thigh (quadriceps), Calf (gastrocnemius), Interosseous, Scapula (trapezius)  5. Fluid Accumulation-No significant fluid accumulation,    6.  Strength-Not measured    Nutrition Diagnosis:   · Problem: Severe malnutrition  · Etiology: related to Insufficient energy/nutrient consumption     Signs and symptoms:  as evidenced by BMI, Diet history of poor intake, Weight loss, Severe muscle loss, Severe loss of subcutaneous fat    Nutrition Assessment:  · Subjective Assessment: Patient S/P Left Vats- 1/17/18. Extubated 1/18. OFF Pressors. · Nutrition-Focused Physical Findings: 1-17- I/- 6134 ml O/- 1850 ml ( 500 ml chest tube ). Last BM- pta. Picc Line. Peg Tube. Last BM- pta. Generalized + 3 Edema.   · Wound Type: Multiple, Stage III, Unstageable (buttock wounds/multiple abrasions, excoriation)  · Current Nutrition Therapies:  · Oral Diet Orders: NPO   · Oral Diet intake:

## 2018-01-18 NOTE — PROGRESS NOTES
Empyema  Aspiration pneumonia        Arcola's chorea     Patient presented to the emergency room on 1/12/18 with failure to thrive  Progressively becoming weaker  Becoming incontinent of stool and urine  Found to have a large loculated left effusion with compressive atelectasis of the lung  Blood cultures showed no growth     Underwent surgery today with decortication on the having large amount of foul-smelling fluid consistent with empyema 4000 mL removed  On Levaquin and Zosyn        Review of Systems   Lethargic  Extubated  Difficult to arouse             /86   Pulse 71   Temp 97.3 °F (36.3 °C) (Oral)   Resp 17   Ht 6' 0.99\" (1.854 m)   Wt 125 lb 10.6 oz (57 kg)   SpO2 100%   BMI 16.58 kg/m²     Cardiovascular: Normal heart sounds and intact distal pulses. Exam reveals no gallop. No murmur heard. Pulmonary/Chest: Effort normal. No respiratory distress. He has no wheezes. He has no rales. He exhibits no tenderness. Abdominal: He exhibits no distension and no mass. There is no hepatosplenomegaly, splenomegaly or hepatomegaly. There is no tenderness. There is no rebound, no guarding and no CVA tenderness. Musculoskeletal: He exhibits no edema or tenderness.              Surgical Culture [[id number]] Collected: 01/17/18 1148   Order Status: Completed Specimen: Body Fluid from Lung Updated: 01/18/18 1112    Gram Stain Result Many WBC's   Many Gram positive cocci in chains - resembling Strep     Anaerobic Culture Culture in progress    Culture Surgical --    No growth on primary media however growth in broth   Further report to follow    Narrative:     ORDER#: [id number]                          ORDERED BY: Seth Silver  SOURCE: Lung #1 Left Lung Fluid; empyema   COLLECTED:  01/17/18 11:48  ANTIBIOTICS AT [facility]. :                      RECEIVED :  01/17/18 14:20   Surgical Culture [[id number]] Collected: 01/17/18 1148   Order Status: Completed Specimen:  Body Fluid from Lung Updated: 01/18/18

## 2018-01-19 NOTE — PROGRESS NOTES
Empyema  Aspiration pneumonia      Underwent surgery  with decortication on the having large amount of foul-smelling fluid consistent with empyema 4000 mL removed   Zosyn        Review of Systems   Lethargic  Difficult to arouse             /86   Pulse 71   Temp 97.5 °F (36.4 °C) (Oral)   Resp 21   Ht 6' 0.99\" (1.854 m)   Wt 125 lb 10.6 oz (57 kg)   SpO2 93%   BMI 16.58 kg/m²     Cardiovascular: Normal heart sounds and intact distal pulses. Exam reveals no gallop. No murmur heard. Pulmonary/Chest: Effort normal. No respiratory distress. He has no wheezes. He has no rales. He exhibits no tenderness. Abdominal: He exhibits no distension and no mass. There is no hepatosplenomegaly, splenomegaly or hepatomegaly. There is no tenderness. There is no rebound, no guarding and no CVA tenderness. Musculoskeletal: He exhibits no edema or tenderness.              Surgical Culture [[id number]] Collected: 01/17/18 1148   Order Status: Completed Specimen: Body Fluid from Lung Updated: 01/18/18 1112    Gram Stain Result Many WBC's   Many Gram positive cocci in chains - resembling Strep     Anaerobic Culture Culture in progress    Culture Surgical --    No growth on primary media however growth in broth   Further report to follow    Narrative:     ORDER#: [id number]                          ORDERED BY: Milli Potter  SOURCE: Lung #1 Left Lung Fluid; empyema   COLLECTED:  01/17/18 11:48  ANTIBIOTICS AT [facility]. :                      RECEIVED :  01/17/18 14:20   Surgical Culture [[id number]] Collected: 01/17/18 1148   Order Status: Completed Specimen:  Body Fluid from Lung Updated: 01/18/18 1110    Gram Stain Result Moderate WBC's   Many Gram positive cocci in chains - resembling Strep     Anaerobic Culture Culture in progress    Culture Surgical --    No growth on primary media however growth in broth   Further report to follow    Narrative:     ORDER#: [id number]                          ORDERED BY: Qi Quiros

## 2018-01-19 NOTE — PROGRESS NOTES
Hospitalist Progress Note      PCP: No primary care provider on file. Date of Admission: 1/12/2018     Subjective:  No complaints, no pain. Medications:  Reviewed    Infusion Medications    norepinephrine Stopped (01/18/18 0925)     Scheduled Medications    IVPB builder   Intravenous Once    chlorhexidine  15 mL Mouth/Throat BID    docusate  100 mg Oral BID    sodium chloride flush  10 mL Intravenous 2 times per day    hypromellose  1 drop Both Eyes 4x Daily    piperacillin-tazobactam  3.375 g Intravenous Q8H    collagenase   Topical Daily    sodium chloride flush  3 mL Intravenous Q8H    citalopram  20 mg Oral Daily    sodium chloride flush  10 mL Intravenous 2 times per day     PRN Meds: polyethylene glycol, ondansetron, sodium chloride flush, sennosides-docusate sodium, sodium chloride flush, acetaminophen, magnesium hydroxide      Intake/Output Summary (Last 24 hours) at 01/19/18 1014  Last data filed at 01/19/18 0500   Gross per 24 hour   Intake             3340 ml   Output             1250 ml   Net             2090 ml       Exam:    /86   Pulse 67   Temp 97.5 °F (36.4 °C) (Oral)   Resp 14   Ht 6' 0.99\" (1.854 m)   Wt 125 lb 10.6 oz (57 kg)   SpO2 100%   BMI 16.58 kg/m²     General appearance: No apparent distress, appears stated age and uncooperative. HEENT: Pupils equal, round, and reactive to light. Conjunctivae/corneas clear. Neck: Supple, with full range of motion. No jugular venous distention. Trachea midline. Respiratory:  Rhochorous throughout; decreased breath sounds on the left left chest tubes insitu  Cardiovascular: Regular rate and rhythm with normal S1/S2 without murmurs, rubs or gallops. Abdomen: Soft, non-tender, non-distended with normal bowel sounds. Musculoskeletal: No clubbing, cyanosis or edema bilaterally. Full range of motion without deformity. Skin: Skin color, texture, turgor normal.  No rashes or lesions.   Neuro: Non Focal.   Capillary Refill:

## 2018-01-19 NOTE — PROGRESS NOTES
smear  · Heparin drop started on the 15th and Lovenox was initiated on the 12th  · 4T score is 3 ( low probability)   · Check INR   · Pf4 pending collection . 8. 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 Severe dysphagia status post PEG tube placement  10. Mild hypernatremia  · Increase water flush   · Monitor Na   11.  Leukocytosis secondary to pneumonia with empyema and postoperatively: Monitor for now, cont ABX         Electronically signed by Gregory Coe MD,  on 1/19/2018 at 11:47 AM

## 2018-01-19 NOTE — PROGRESS NOTES
Physical Therapy Med Surg Initial Assessment  Facility/Department: [facility] ICU  Room: [place] [address]       NAME: Micheal Galan  : 1950 (78 y.o.)  MRN: [id number]  CODE STATUS: Full Code    Date of Service: 2018    Patient Diagnosis(es): Pneumonia [J18.9]   Chief Complaint   Patient presents with    Failure To Thrive     Patient Active Problem List    Diagnosis Date Noted    Empyema ([address]     Shock ([address]     Sepsis due to pneumonia ([address] 2018    Severe protein-calorie malnutrition ([address] 2018    Dysphagia causing pulmonary aspiration with swallowing 2018    Dementia associated with other underlying disease 2018    Aspiration pneumonia ([address] 2018    Inguinal hernia     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  Inguinal hernia      Past Surgical History:   Procedure Laterality Date    HEMORRHOID SURGERY      IN EGD PERCUTANEOUS PLACEMENT GASTROSTOMY TUBE N/A 2018    EGD ESOPHAGOGASTRODUODENOSCOPY PEG TUBE INSERTION performed by Jess Ramirez MD at [facility] DX LUNGS/PERICAR/MED/PLEURAL SPACE W/O BX Left 2018    LEFT VATS WITH DECORTICATION FIBEROPTIC BRONCHOSCOPY DOUBLE LUMEN performed by Deion Louis MD at [facility] 2018    EGD ESOPHAGOGASTRODUODENOSCOPY performed by Kate Enriquez DO at [facility] [place]       Chart Reviewed: Yes  Patient assessed for rehabilitation services?: Yes  General Comment  Comments: Patient agreeable to PT evaluation. Restrictions:  Restrictions/Precautions: Fall Risk  Body mass index is 16.58 kg/m². SUBJECTIVE: Subjective: \"My name is Mirtha Sharpe. \"     Post Treatment Pain Screening:   Pain Screening  Patient Currently in Pain: No  Pain Assessment  Pain Assessment: 0-10  Pain Level: 0    Prior Level of Function:  Social/Functional History  Lives With: Spouse  Type of Home: House  Home Layout: One level  Home Access: Ramped entrance  ADL Assistance: Needs assistance  Ambulation Assistance: Needs assistance  Transfer Assistance: Needs assistance  Additional Comments: per chart patient has been getting weaker and wife has been assisting; patient is poor historian    OBJECTIVE:   Vision/Hearing:  Vision: Within Functional Limits  Hearing: Within functional limits    Cognition:  Overall Orientation Status: Within Normal Limits  Follows Commands: Within Functional Limits    Observation/Palpation  Posture: Poor (forward flexed posture)  Observation: thin    ROM:     Strength:  Strength RLE  Comment: < 3/5 strength grossly  Strength LLE  Comment: <3/5 strength grossly  Strength Other  Other: Decreased core strength based off seated posture    Neuro:  Balance  Sitting - Static: Fair  Sitting - Dynamic: Poor        Sensation  Overall Sensation Status: WFL (denies any numbness and tingling)    Bed mobility  Supine to Sit:  (NT patient sitting up in bedside chair)    Transfers  Sit to Stand: Dependent/Total (1/2 stand position, patient not able to get up )  Stand to sit: Dependent/Total (1/2 stand position, patient not able to get up)     Activity Tolerance  Activity Tolerance: Patient limited by cognitive status; Patient limited by fatigue;Patient limited by endurance      ASSESSMENT:   Body structures, Functions, Activity limitations: Decreased functional mobility ; Decreased strength;Decreased safe awareness;Decreased endurance;Decreased balance;Decreased coordination  Decision Making: High Complexity  History: high  Exam: high  Clinical Presentation: unstable    Prognosis: Fair  Patient Education: ankle pumps    DISCHARGE RECOMMENDATIONS:  Discharge Recommendations: Continue to assess pending progress    Assessment: Patient demonstrates decline in functional status.   Recommend trial of PT to see

## 2018-01-19 NOTE — FLOWSHEET NOTE
2030: Assessment initiated. HOB up 45 degrees. Oral mucosa dry and cracking. Mouth care provided. Right eye socket has small amount of ecchymosis as does left. Small, dry, healing abrasion noted distal to right eye. Area is nonreddened. Right shoulder dressing clean, dry, and intact. Right hip dressing clean, dry, and intact with only small amount of redness noted on hip. Left shoulder mepelex removed b/c no redness noted. Left anterior upper chest tube is #32 [place], 12 cm at chest wall. Associated incision is without drainage or ecchymosis. Sutures intact. Left posterior lower chest tube is #32 [place] 11.5 cm at chest wall. Associated incision is without redness or ecchymosis. Sutures intact. Both chest tubes maintained to -20 cm of suction and small air leak noted with deep inspiration. No crepitus or c.o of pain. Left hip mepelex intact with redness noted. 2130: Involuntary of moderate liquid brown stool. Pericare given. 2300:  Eyes closed. 0100:  Tylenol given for c/o pain in left chest tube area.

## 2018-01-19 NOTE — PROGRESS NOTES
historian     Pain:   Start of session: none reported/10  Location: na  Description: na  Action: [x] No Action Necessary    [] Patient reports pain at acceptable level for treatment  [] Nursing notified    [] Other      Objective:  Observation:  Alert, cooperative    Orientation: Oriented to  [x] Person   [x] Place  []Time    Vision:   [x]  WFL   [] Impaired  Comments:      Hearing:  [x] WFL   [] Impaired  Comments:    Sensation:   [] WFL   [x]  Impaired   Comments:      Cognition:   [] WFL   [x] Impaired  Comments:slow to respond attempts to follow commands, perseverates  Communication:   [] WFL   [] Impaired  Comments:    Hand Dominance: [x] Right  [] Left    Range of Motion:  R UE AROM/PROM: []  WFL [x] Impaired  Comments: ~ 50% AROM   L UE AROM/PROM:  []  WFL [x] Impaired  Comments:     Strength:   R UE Strength: []1    [] 2   [] 2+   [] 3   [] 3+   [x] 4   [] 4+  [] 5  Comments:   L UE Strength:  []1    [] 2   [] 2+   [] 3   [] 3+  [x] 4   [] 4+   [] 5  Comments:     Quality of Movement:  [] Good   [x] Fair-   [] Poor     Coordination: athetoid movts throughout body  Gross motor: [] WFL   [x] Impaired   Fine motor: [] WFL   [x] Impaired     Functional Mobility:  Toilet Transfers:  Dep   Bed Transfer:Dep reclined to sit  Sit to stand: Dep to 50 % stand   Bed to Chair:  Unsafe to test    Seated Balance:      Static: [] Good  [x] Fair   [] Poor   Dynamic: []  Good  [x] Fair   [] Poor     Standing Balance:  Unable to fully stand   Static: [] Good   [] Fair  [] Poor   Dynamic: [] Good   [] Fair   [] Poor     Functional Endurance: [] Good  [x] Fair  [] Poor     ADLs  Feeding:  NPO  UE Dressing:  Max A secondary to athetoid movts  LB Dressing:  Dep unable to reach to feet  Bathing:  Dep   Toileting: Dep per nursing  Grooming: Max A to wash face     Goals:   Patient will:   [x]  Improve functional endurance to tolerate/complete 30 mins of ADL's   [x]  Be Mod A in UB ADLs    [x]  Be Mod A in LB ADLs   [x]  Be Mod A in ADL transfers without LOB   [x]  Be Mod A in toileting tasks   [x]  Improve B hand fine motor coordination to [address] in order to manage clothing fasteners/self-care containers in a timely manner   [x]  Improve B UE Function (AROM, strength, motor control, tone normalization) to complete ADLs as projected. [x]  Improve B UE strength and endurance to Fair in order to participate in self-care activities as projected. [x]  Access appropriate D/C site with as few architectural barriers as possible. Treatment Plan will consist of:   [x] ADL Training   [x] Strengthening   [x] Endurance   [x] Transfer Training   [x]  DME ed      [x] HEP  [x] Manual Therapy   [x] AROM/PROM    [x] Coordination   [x] Cognitive Training   [x]Safety training   [] Other :    Patient Goal: none stated  Discussed and agreed upon: [x] Yes   [] No Comment:     Assessment/Discharge Disposition:     Performance deficits / Impairments: Decreased functional mobility , Decreased ADL status, Decreased ROM, Decreased safe awareness, Decreased balance, Decreased coordination, Decreased endurance, Decreased high-level IADLs, Decreased fine motor control  Prognosis: Fair  Discharge Recommendations: Continue to assess pending progress  History: multi comorb  Exam: 9 deficits  Assistance / Modification: Max A/Dep      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    Discharge Recommendations:  therapy @ D/C may be benificial    Recommended DME:  [x] W/W   [] Cindy Fuller   [] Rollator   [x] W/C   [x] Betty Minors  [x] Shower Chair   [x]Dressing AD []  BSC  [] Other:    Plan:Times per week: 1-3x,      G-Codes:  OT G-codes  Functional Limitation: Self care  Self Care Current Status (): 100 percent impaired, limited or restricted  Self Care Goal Status ():  At least 40 percent but less than 60 percent impaired, limited or restricted    Time in:  1:10  Time out:  1:35  Total minutes:  25  Timed treatment minutes:  25      Electronically signed by:    Kurt Chavez Nadya Miller, OTR/L  1/19/2018, 1:43 PM

## 2018-01-19 NOTE — PROGRESS NOTES
Nutrition Assessment    Type and Reason for Visit: Reassess    Nutrition Recommendations: Continue NPO, Modify current Tube Feeding Recommend increase Standard without FIber ( Osmolite 1.2) to  60 ml/hr   This will deliver ~ 1440  kcals, 1728 kcals, 80 g protein, 1154 ml free water   Continue with wound healing supplement mixed with 100 ml water 2x day via PEG Verify weight  Malnutrition Assessment:  · Malnutrition Status: Meets the criteria for severe malnutrition  · Context: Chronic illness  · Findings of the 6 clinical characteristics of malnutrition (Minimum of 2 out of 6 clinical characteristics is required to make the diagnosis of moderate or severe Protein Calorie Malnutrition based on AND/ASPEN Guidelines):  1. Energy Intake-Less than or equal to 50%, greater than 7 days    2. Weight Loss-20% loss or greater,  (~18months)  3. Fat Loss-Severe subcutaneous fat loss, Orbital, Triceps, Fat overlying the ribs  4. Muscle Loss-Severe muscle mass loss, Temples (temporalis muscle), Clavicles (pectoralis and deltoids), Thigh (quadriceps), Calf (gastrocnemius), Interosseous, Scapula (trapezius)  5. Fluid Accumulation-No significant fluid accumulation,    6.  Strength-Not measured    Nutrition Diagnosis:   · Problem: Severe malnutrition  · Etiology: related to Insufficient energy/nutrient consumption     Signs and symptoms:  as evidenced by BMI, Diet history of poor intake, Weight loss, Severe muscle loss, Severe loss of subcutaneous fat    Nutrition Assessment:  · Subjective Assessment: Per rounds tolerating EN via PEG, water flushes to be increased to 200 ml every 4 hrs.   · Nutrition-Focused Physical Findings: I/O = 3470/1250, BM 1/18, 1/19, PICC, 2+ edema lupillo hands and feet  · Wound Type: Multiple, Stage III, Unstageable (buttock wounds/multiple abrasions, excoriation)  · Current Nutrition Therapies:  · Oral Diet Orders: NPO   · Oral Diet intake: NPO  · Oral Nutrition Supplement (ONS) Orders: Nutrition Doc Flowsheet for more detail.      Electronically signed by April Rose RD, RENE on 1/19/18 at 1:45 PM

## 2018-01-19 NOTE — PROGRESS NOTES
Progress Note    POD # 2. Patient is Caprice Riff     The chest x-ray: Acceptable po appearance                              No further drop in platelet count. WBC remain apprx 20,000  Chest tubes: Drainage now approx 950cc since OR. Pain management: Adequate    Plan: Awaiting C/S report. With air leak noted we wioll increase CT suction to 40 cm.     Electronically signed by Geoff Rodrigues MD on 1/19/2018 at 3:40 PM

## 2018-01-19 NOTE — PLAN OF CARE
Problem: Nutrition  Goal: Optimal nutrition therapy     Outcome: Ongoing  Nutrition Problem: Severe malnutrition  Intervention: Food and/or Nutrient Delivery: Continue NPO, Modify current Tube Feeding (Recommend increase Standard without FIber ( Osmolite 1.2) to  60 ml/hr ( 1440  kcals, 1728 kcals, 80 g protein, 1154 ml free water), Continue wityh wound healing supplement mixed with 100 ml water 2x day via PEG, Verify weight)  Nutritional Goals: En to deliver > 85% estimated needs for kcals/protein to promote wound healing.  Verify Weight

## 2018-01-20 NOTE — PROGRESS NOTES
2000 Head to toe assessment completed per flowsheet. Pt had med brown loose stool, cleaned and linen changed, Pt requires 2 person assist. Left anterior upper chest tube is #32 [place], 12 cm at chest wall, site dry, sutures intact. -40 cm suction, air leak presnt,  No crepitus noted. Left posterior lower chest tube is #32 [place] 11.5 cm at chest wall, site dry and Sutures intact, no signs of crepitus, both chest tubes patent and draining serosang fluid. 0000 No change in assessment. Chest tubes intact, air leak present, no crepitus noted. Pt had large bowel movement, cleaned,linen changed,  barrier cream applied. Repositioned for comfort and skin breakdown prevention.

## 2018-01-20 NOTE — PROGRESS NOTES
Hospitalist Progress Note      PCP: No primary care provider on file. Date of Admission: 1/12/2018     Subjective:  No complaints    Medications:  Reviewed    Infusion Medications    lactated ringers 75 mL/hr at 01/19/18 1945    norepinephrine Stopped (01/18/18 0925)     Scheduled Medications    clindamycin (CLEOCIN) IV  900 mg Intravenous Q8H    chlorhexidine  15 mL Mouth/Throat BID    docusate  100 mg Oral BID    sodium chloride flush  10 mL Intravenous 2 times per day    hypromellose  1 drop Both Eyes 4x Daily    collagenase   Topical Daily    sodium chloride flush  3 mL Intravenous Q8H    citalopram  20 mg Oral Daily    sodium chloride flush  10 mL Intravenous 2 times per day     PRN Meds: polyethylene glycol, ondansetron, sodium chloride flush, sennosides-docusate sodium, sodium chloride flush, acetaminophen, magnesium hydroxide      Intake/Output Summary (Last 24 hours) at 01/20/18 1812  Last data filed at 01/20/18 0730   Gross per 24 hour   Intake             1630 ml   Output             1140 ml   Net              490 ml       Exam:    /64   Pulse 66   Temp 97.6 °F (36.4 °C) (Axillary)   Resp 11   Ht 6' 0.99\" (1.854 m)   Wt 125 lb 10.6 oz (57 kg)   SpO2 96%   BMI 16.58 kg/m²     General appearance: No apparent distress, appears stated age and uncooperative. HEENT: Pupils equal, round, and reactive to light. Conjunctivae/corneas clear. Neck: Supple, with full range of motion. No jugular venous distention. Trachea midline. Respiratory:  Rhochorous throughout; decreased breath sounds on the left left chest tubes insitu  Cardiovascular: Regular rate and rhythm with normal S1/S2 without murmurs, rubs or gallops. Abdomen: Soft, non-tender, non-distended with normal bowel sounds. Skin: Skin color, texture, turgor normal.  No rashes or lesions.   Ext : No cyanosis,Clubbing or edema    Labs:   Recent Labs      01/18/18   0919  01/19/18   0600  01/20/18   0600   WBC  19.1*  20.3*  13.2* IMMEDIATE COMPLICATIONS. XR CHEST PORTABLE   Final Result      POSTOPERATIVE CHEST WITH TWO LEFT-SIDED CHEST TUBES, TRACE RESIDUAL LEFT PLEURAL EFFUSION AND PNEUMOTHORAX.      ENDOTRACHEAL TUBE IN GOOD APPARENT POSITION. REEXPANSION EDEMA OR PNEUMONIA THROUGHOUT THE LEFT LUNG. RADIOLOGY REPORT   Final Result      CT Chest W Contrast   Final Result   SUBOPTIMAL EXAMINATION DUE TO MOTION ARTIFACT. HUGE LOCULATED LEFT PLEURAL EFFUSION, CAUSING COMPRESSIVE ATELECTASIS OF THE LEFT LOWER LOBE AND LINGULA, AND MILD MEDIASTINAL SHIFT TO THE RIGHT. AT LEAST 3 RIGHT LOWER LUNG NODULES AND A 7 MM    INDETERMINATE RIGHT UPPER LUNG SUBPLEURAL OPACITY--RECOMMEND FOLLOW-UP CHEST CT AFTER TREATMENT (DRAINAGE) OF LEFT PLEURAL EFFUSION AND LEFT LUNG REEXPANSION, AND AT A TIME WHEN THE PATIENT CAN BE MORE COOPERATIVE, FOR BETTER EVALUATION OF LUNG NODULES. XR CHEST (SINGLE VIEW FRONTAL)   Final Result   LARGE LEFT PLEURAL EFFUSION. UNDERLYING MASS OR CONSOLIDATION IS NOT EXCLUDED. CONSIDER CHEST CT FOR FURTHER EVALUATION. NO CHANGE FROM 1/12/2018. ADDITIONAL FINDINGS AS ABOVE. XR CHEST PORTABLE   Final Result      MODERATE-SIZED LEFT PLEURAL EFFUSION AND PROBABLE ADJACENT ATELECTASIS. BRONCHOPNEUMONIA SHOULD BE EXCLUDED CLINICALLY. CT Head WO Contrast   Final Result   1. POSTERIOR SMALL SCALP HEMATOMA WITHOUT AN UNDERLYING SKULL FRACTURE. 1. 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 2. NO ACUTE INTRA-AXIAL OR EXTRA-AXIAL FINDINGS IN THE BRAIN. All CT scans at this facility use dose modulation, iterative reconstruction, and/or weight based dosing when appropriate to reduce radiation dose to as low as reasonably achievable. XR CHEST PORTABLE    (Results Pending)           Assessment/Plan:    1.  Severe Sepsis with Lactic Acidosis, end organ dysfunction (IMER), hypotension most likely due to aspiration PNA present on admission: Large

## 2018-01-20 NOTE — PROGRESS NOTES
Empyema  Aspiration pneumonia      Underwent surgery  with decortication on the having large amount of foul-smelling fluid consistent with empyema 4000 mL removed          Review of Systems   Lethargic  Difficult to arouse             /78   Pulse 63   Temp 97.6 °F (36.4 °C) (Axillary)   Resp 11   Ht 6' 0.99\" (1.854 m)   Wt 125 lb 10.6 oz (57 kg)   SpO2 100%   BMI 16.58 kg/m²     Cardiovascular: Normal heart sounds and intact distal pulses. Exam reveals no gallop. No murmur heard. Pulmonary/Chest: Effort normal. No respiratory distress. He has no wheezes. He has no rales. He exhibits no tenderness. Abdominal: He exhibits no distension and no mass. There is no hepatosplenomegaly, splenomegaly or hepatomegaly. There is no tenderness. There is no rebound, no guarding and no CVA tenderness. Musculoskeletal: He exhibits no edema or tenderness. skin shows rash starting on face and arms some of the chest Zosyn was discontinued earlier today                Surgical Culture [[id number]] (Abnormal) Collected: 01/17/18 1148   Order Status: Completed Specimen: Body Fluid from Lung Updated: 01/20/18 1101    Gram Stain Result Moderate WBC's   Many Gram positive cocci in chains - resembling Strep     Anaerobic Culture No Anaerobes Isolated    Organism Streptococcus intermedius (A)    Culture Surgical --    Moderate growth   Refer to previous sensitivity    Narrative:     ORDER#: [id number]                          ORDERED BY: Luciano Hale  SOURCE: Lung #2 Left Lung Fluid; empyema   COLLECTED:  01/17/18 11:48  ANTIBIOTICS AT [facility] :                      RECEIVED :  01/17/18 14:21   Surgical Culture [[id number]] (Abnormal)  Collected: 01/17/18 1148   Order Status: Completed Specimen:  Body Fluid from Lung Updated: 01/20/18 1100    Gram Stain Result Many WBC's   Many Gram positive cocci in chains - resembling Strep     Anaerobic Culture No Anaerobes Isolated    Organism Streptococcus intermedius (A)    Culture Surgical Heavy growth                 Patient Active Problem List   Diagnosis    Inguinal hernia    [place]a [facility]    Diverticulitis    Colitis, acute    Pneumonia    Sepsis due to pneumonia ([address]    Severe protein-calorie malnutrition ([facility]    Dysphagia causing pulmonary aspiration with swallowing    Dementia associated with other underlying disease         ASSESSMENT:  PLAN:     Aspiration pneumonia  Empyema     Given the fact that now growing Streptococcus intermedius source of the pneumonia empyema will be mouth paula    Zosyn Was discontinued for development of rash  Started meropenem  Change to clindamycin given the fact that this is an aspiration pneumonia and empyema

## 2018-01-21 NOTE — PROGRESS NOTES
CT placement checked. Both CT remain 12 at the chest wall. Sutures in place. Area's reddened, no drainage noted.

## 2018-01-21 NOTE — PROGRESS NOTES
HGB  9.3*  9.8*  10.0*   HCT  28.5*  30.2*  30.9*   PLT  50*  57*  63*     Recent Labs      01/19/18   0600  01/19/18   1750  01/20/18   0600   NA  149*  150*  150*   K  3.6  3.6  3.3*   CL  116*  116*  117*   CO2  24  25  26   BUN  32*  31*  25*   CREATININE  0.61*  0.48*  0.47*   CALCIUM  7.4*  7.5*  7.4*   PHOS  2.2*  2.4*  1.9*     Recent Labs      01/18/18   0942   AST  73*   ALT  67*   BILITOT  0.6   ALKPHOS  107*     Recent Labs      01/19/18   0930   INR  1.3     No results for input(s): CKTOTAL, TROPONINI in the last 72 hours. Urinalysis:      Lab Results   Component Value Date    NITRU Negative 01/12/2018    BLOODU Negative 01/12/2018    SPECGRAV 1.023 01/12/2018    GLUCOSEU Negative 01/12/2018       Radiology:  CT CHEST WO CONTRAST   Final Result      Moderate left pneumothorax. Left thoracostomy tubes as described. Left lower lobe atelectasis. Moderate right pleural effusion with right lower lobe atelectasis. Subacute fracture proximal right 11th rib. All CT scans at this facility use dose modulation, iterative reconstruction, and/or weight based dosing when appropriate to reduce radiation dose to as low as reasonably achievable. XR CHEST PORTABLE   Final Result      INTERVAL ENDOTRACHEAL TUBE EXTUBATION. OTHERWISE, STABLE POSTOPERATIVE CHEST COMPARED TO 1/18/2018. XR CHEST PORTABLE   Final Result   TRACE LEFT BASILAR PNEUMOTHORAX, SMALLER THAN ON 1/17/2018; OTHERWISE NO SIGNIFICANT CARDIOPULMONARY CHANGE FROM 1/17/2018--SEE ABOVE. INTERVAL PLACEMENT OF RIGHT UPPER EXTREMITY PICC. IR PICC WO SQ PORT/PUMP > 5 YEARS   Final Result      SUCCESSFUL PICC PLACEMENT WITHOUT IMMEDIATE COMPLICATIONS. IR FLUORO GUIDED CVA DEVICE PLACEMENT   Final Result      SUCCESSFUL PICC PLACEMENT WITHOUT IMMEDIATE COMPLICATIONS.                         IR ULTRASOUND GUIDANCE VASCULAR ACCESS   Final Result      SUCCESSFUL PICC PLACEMENT WITHOUT

## 2018-01-22 NOTE — PROGRESS NOTES
Following up on the patient. Taking over from Dr. Sanaz Alcantar. Patient is laying in bed comfortably. The chest tube on the left side. The Vee catheter. The patient has greater difficulties communicating. He is awake and oriented that. He does not appear in any distress. He denies any chest or abdominal pain. No headaches. No reported seizure or loss of consciousness. Review of systems a 12 system review otherwise negative for acute signs or symptoms. Patient is awake and oriented. Difficulties communication. The chest tube on the left side. HEENT pale skin and the bucca mucosa. Neck supple. Vascular no JVD or carotid bruit. Endocrine no thyromegaly. Chest bilateral breath sounds. Bilateral fine rhonchi. Diminished breath sounds on the left side. Heart regular rate and rhythm. Abdomen soft, no tenderness, no rebound. Ext mechanical at this time. 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 remities no cyanosis or clubbing, no varices or edema. Neurological exam awake and oriented. Symmetrical motor and tone with the functional impairment. Inability to articulate appropriately at baseline. 1. Sepsis, leukocytosis, antibiotic Management and infectious disease. 2.  Thrombocytopenia, improving, unknown etiology, probably secondary to sepsis. 3.  Functional impairment and the debility.:  Physical and occupational therapy evaluation and treatment. 4.  Pneumonia, empyema, hypoxemia: Oxygen supplementation, antibiotic and pulmonary management by the pulmonary team.    5.  Anemia stable, no evidence of acute blood loss. 6.  Decubitus ulceration, wound care. 7.  Moderate to severe protein malnutrition. Dietitian and nutrition consultation. 8.  Disposition: Likely patient will require either a skilled nursing facility or long-term hospital care. 9.  DVT prophylaxis: Mechanical at this time. Avoid anticoagulation given his platelets account of the proper.     Further

## 2018-01-22 NOTE — PROGRESS NOTES
Physical Therapy Missed Treatment   Facility/Department: [facility] MED SURG IC05/IC05-01    NAME: Abdoul Chavez    : 1950 (78 y.o.)  MRN: [id number]    Account: [de-identified]  Gender: male     [x] Patient Refused: Pt would yell out when asked if he wanted to participate. Sounded like he was trying to say \"tomorrow. \"  He calmed down when I said I could come back tomorrow. Will attempt PT treatment again at earliest convenience.       Electronically signed by Marcus Castillo PTA on 18 at 3:02 PM

## 2018-01-22 NOTE — PROGRESS NOTES
Micheal Galan is a 78 y.o. male patient. Subjective: Activity level: Impaired due to weakness. Pain:  He reports no pain. Objective:  Vital signs: (most recent): Blood pressure (!) 120/103, pulse 90, temperature 98.2 °F (36.8 °C), temperature source Temporal, resp. rate 30, height 6' 0.99\" (1.854 m), weight 125 lb 10.6 oz (57 kg), SpO2 99 %. Assessment:  (Large air leak noted around chest tube continuous nPt has new area of large crepitus in left groin area. Frequent turning completed on pt. Pt sodium 145 water flush 400 cc every 4 hrs\  Pt has minimal urine output at this time. ).        Ginna Patricia RN  1/22/2018

## 2018-01-22 NOTE — PROGRESS NOTES
>60.0  >60.0   GFRAA  >60.0  >60.0       MV Settings:     Vent Mode: SPONT  Vt Ordered: 550 mL  Rate Set: 12 bmp  FiO2 : 100 %  PEEP/CPAP: 5  Pressure Support: 5 cmH20  Peak Inspiratory Pressure: 28 cmH2O  Mean Airway Pressure: 8.9 cmH20  I:E Ratio: 1:1.60    No results for input(s): PHART, FVF0BTY, PO2ART, HZY3HZY, BEART, M9NPCTSY in the last 72 hours. O2 Device: Nasal cannula  O2 Flow Rate (L/min): 3 L/min    DIET TUBE FEED CONTINUOUS/CYCLIC NPO; STANDARD WITHOUT FIBER; Gastrostomy; Continuous; 20; 60; 24     MEDICATIONS during current hospitalization:    Continuous Infusions:     Scheduled Meds:   clindamycin (CLEOCIN) IV  900 mg Intravenous Q8H    chlorhexidine  15 mL Mouth/Throat BID    docusate  100 mg Oral BID    sodium chloride flush  10 mL Intravenous 2 times per day    hypromellose  1 drop Both Eyes 4x Daily    collagenase   Topical Daily    sodium chloride flush  3 mL Intravenous Q8H    citalopram  20 mg Oral Daily    sodium chloride flush  10 mL Intravenous 2 times per day       PRN Meds:polyethylene glycol, ondansetron, sodium chloride flush, sennosides-docusate sodium, sodium chloride flush, acetaminophen, magnesium hydroxide    Radiology  Xr Chest (single View Frontal)    Result Date: 1/13/2018  EXAMINATION: XR CHEST LIMITED SINGLE VIEW CLINICAL HISTORY: Weakness, falls at home. COMPARISONS: 1/12/2018 FINDINGS: There is a large left lung opacity, unchanged from 1/12/2018. The configuration of the opacity suggests large pleural effusion which may be partially loculated. Underlying mass or consolidation is not excluded. Chest CT may be obtained for further imaging evaluation. There is discoid atelectasis in the left suprahilar region. The right lung remains clear. There are radiographic findings suggestive of COPD. Cardiac size and pulmonary vascularity are normal. There are deformities of the left  seventh and eighth ribs consistent with old healed fractures.  No definite acute fractures gloves after proper hand cleansing. A pre-procedure time out was performed in order to assure the correct patient and procedure. Local anesthetic was administered. A peripheral vein was accessed with sonographic guidance. A sonographic spot image was obtained for documentation. A guidewire was advanced into the vein with fluoroscopic guidance and a sheath was placed over the guidewire. A 5-Mauritian 39 cm triple-lumen PICC was advanced through the sheath, up the arm and into the central vasculature. It was positioned appropriately. The sheath was removed. The catheter was shown to aspirate and infuse properly. The flange of the catheter was affixed to the arm using a PICC securement device. A spot image of the chest showed the tip of the PICC line to lie in the cavoatrial junction. The patient tolerated the procedure well and without complications. 41.9 seconds of fluoroscopy was used, with a single still saved. No diagnostic images were obtained. SUCCESSFUL PICC PLACEMENT WITHOUT IMMEDIATE COMPLICATIONS. Xr Chest Portable    Result Date: 1/22/2018  EXAMINATION: XR CHEST PORTABLE REASON FOR EXAM: F/U effusion, pneumothorax FINDINGS: Opacification at the right base and blunting of the right costophrenic angle has increased compared to study of 1/21/2018. No evidence of pneumothorax on the right side. Chest tubes overlie the left mid and lower lung field unchanged. Left pneumothorax remains stable and unchanged. No shift of mediastinal structures. Right-sided PICC catheter in satisfactory position unchanged. INCREASING INFILTRATE AND EFFUSION RIGHT BASE COMPARED TO YESTERDAY'S STUDY. Xr Chest Portable    Result Date: 1/21/2018  Chest one view. HISTORY: Empyema. FINDINGS: Comparison, chest radiograph, January 19, 2018, CT chest, January 20, 2018 FINDINGS: Right PICC, and left thoracostomy tubes, again identified and unchanged in position. Patient rotated to right.  Increased opacity in right lower lung, and to lesser degree, right upper lung. Increased opacity in left mid and lower lung. Blunting left costophrenic angle with fluid coursing along left chest wall. Pneumothorax identified retracting left lung approximately 1 cm from left chest wall. Interval development right lower lung atelectasis/pneumonia. Persistent left lung atelectasis/pneumonia. Left pleural effusion. No change left pneumothorax    Xr Chest Portable    Result Date: 1/19/2018  XR CHEST PORTABLE : 1/19/2018 CLINICAL HISTORY: Status post left thoracotomy. COMPARISON: 1/18/2018. TECHNIQUE: A portable upright AP radiograph of the chest was obtained. FINDINGS: An endotracheal tube has been removed. Two left-sided chest tubes appear unchanged, with a trace pneumothorax predominantly of the inferolateral left lung base. Mild to moderate infiltrate within the left mid to lower lung field appears unchanged. The right lung is clear. A right upper extremity PICC catheter is again noted. There is no cardiomegaly, significant residual pleural effusions, or other changes identified elsewhere. INTERVAL ENDOTRACHEAL TUBE EXTUBATION. OTHERWISE, STABLE POSTOPERATIVE CHEST COMPARED TO 1/18/2018. Xr Chest Portable    Result Date: 1/18/2018  EXAMINATION: XR CHEST PORTABLE CLINICAL HISTORY:  post op thoracotomy  follow-up COMPARISONS: 1/17/2018 FINDINGS: Since prior examination, a right upper extremity PICC has been inserted with the tip at the cavoatrial junction. Previously reported left basilar small pneumothorax is smaller than on prior exam; otherwise, there is no significant cardiopulmonary change from prior examination. There is again identified fairly extensive left mid lung infiltrate, left mild pleural fluid or thickening, and 2 left-sided chest tubes. The endotracheal tube tip is at the level the clavicles approximately  6 cm above the dilia.  Cardiac size and pulmonary vascularity are normal. There are several old left rib fractures. TRACE LEFT BASILAR PNEUMOTHORAX, SMALLER THAN ON 1/17/2018; OTHERWISE NO SIGNIFICANT CARDIOPULMONARY CHANGE FROM 1/17/2018--SEE ABOVE. INTERVAL PLACEMENT OF RIGHT UPPER EXTREMITY PICC. Xr Chest Portable    Result Date: 1/17/2018  XR CHEST PORTABLE : 1/17/2018 CLINICAL HISTORY:  S/P Surgery for Left Chest Empyema . COMPARISON: Chest CT 1/14/2018. TECHNIQUE: Two portable upright AP radiographs of the chest were obtained. FINDINGS: An endotracheal tube is noted in good apparent position with its tip approximately 5 to 6 cm above the dilia. 2 left-sided chest tubes have been placed with evacuation of the large somewhat loculated left pleural effusion. A trace pneumothorax is noted anterolaterally and along the inferior lateral left hemithorax. A small residual effusion is present. Reexpansion edema or pneumonia of the left lung is present. The right lung remains clear. There is no cardiomegaly, vascular congestion. POSTOPERATIVE CHEST WITH TWO LEFT-SIDED CHEST TUBES, TRACE RESIDUAL LEFT PLEURAL EFFUSION AND PNEUMOTHORAX. ENDOTRACHEAL TUBE IN GOOD APPARENT POSITION. REEXPANSION EDEMA OR PNEUMONIA THROUGHOUT THE LEFT LUNG. Xr Chest Portable    Result Date: 1/12/2018  XR CHEST PORTABLE: 1/12/2018 CLINICAL HISTORY: Weakness. COMPARISON: 1/27/2015. A portable semierect AP radiograph of the chest was obtained. FINDINGS: A moderate to large sized left pleural effusion is present with probable adjacent atelectasis. Bronchopneumonia should be excluded clinically. There is no right lung pulmonary infiltrate, significant right pleural effusion, cardiomegaly, vascular congestion, pneumothorax, or displaced fractures identified. MODERATE-SIZED LEFT PLEURAL EFFUSION AND PROBABLE ADJACENT ATELECTASIS. BRONCHOPNEUMONIA SHOULD BE EXCLUDED CLINICALLY.      Ir Picc Wo Sq Port/pump > 5 Years    Result Date: 1/17/2018  PERIPHERALLY INSERTED CENTRAL CATHETER (PICC) PLACEMENT WITH ULTRASOUND GUIDANCE: 1/17/2018 CLINICAL HISTORY:  IV access-PICC insertion . Antibiotics for empyema and pneumonia. After discussing the procedure and possible complications with the patient, informed consent was obtained. The patient was placed on the Special Procedures table. The right upper extremity was sterilely prepared using 2% chlorhexidine and then draped with sterile towels and a body-sized sterile drape. All personnel in the Special Procedures Room donned caps and surgical masks. In addition, the  and first assistant donned sterile gowns and gloves after proper hand cleansing. A pre-procedure time out was performed in order to assure the correct patient and procedure. Local anesthetic was administered. A peripheral vein was accessed with sonographic guidance. A sonographic spot image was obtained for documentation. A guidewire was advanced into the vein with fluoroscopic guidance and a sheath was placed over the guidewire. A 5-Finnish 39 cm triple-lumen PICC was advanced through the sheath, up the arm and into the central vasculature. It was positioned appropriately. The sheath was removed. The catheter was shown to aspirate and infuse properly. The flange of the catheter was affixed to the arm using a PICC securement device. A spot image of the chest showed the tip of the PICC line to lie in the cavoatrial junction. The patient tolerated the procedure well and without complications. 41.9 seconds of fluoroscopy was used, with a single still saved. No diagnostic images were obtained. SUCCESSFUL PICC PLACEMENT WITHOUT IMMEDIATE COMPLICATIONS. Ir Ultrasound Guidance Vascular Access    Result Date: 1/17/2018  PERIPHERALLY INSERTED CENTRAL CATHETER (PICC) PLACEMENT WITH ULTRASOUND GUIDANCE: 1/17/2018 CLINICAL HISTORY:  IV access-PICC insertion . Antibiotics for empyema and pneumonia. After discussing the procedure and possible complications with the patient, informed consent was obtained.   The

## 2018-01-22 NOTE — PROGRESS NOTES
Progress Note    POD # 5    Patient is Tonye Rudy     The chest x-ray: Increasing right -sided infiltrate but no change on left. Chest tubes: Remain in place. Drainage will persist for a while since the failure of the left lung to re-expand allows a space to persist and drainage will pursue.       Plan: Reduce CT suction from 40 to 30 cm    Electronically signed by Dimple Bagley MD on 1/22/2018 at 5:33 PM

## 2018-01-23 NOTE — PROGRESS NOTES
Report called to Nurse Taisha Wood, at [facility].. Discussed current  POC, patient current status, wound care, peg tube that is currently clamped, chest tube care that has been placed to water seal, skin integrity and current body rash, O2 @ 4L per NC, triple lumen PICC line to right upper arm that is currently capped w/ no fluids infusing, and code status of [facility]. Nurse denies any questions/concerns @ this time; requesting pain medication be given prior to transport; transport set for 1700.

## 2018-01-23 NOTE — PROGRESS NOTES
Physical Therapy Missed Treatment   Facility/Department: [facility] MED SURG IC05/IC05-01    NAME: Kofi Dailey    : 1950 (78 y.o.)  MRN: [id number]    Account: [de-identified]  Gender: male     Per pt's RN, pt had a heart attack last night and will be going hospice. Will discuss change in medical status w/ supervising PT.      Electronically signed by Jennifer Strickland PTA on 18 at 2:11 PM

## 2018-01-23 NOTE — DISCHARGE SUMMARY
Please refer to my morning. No chest for details. Patient was seen by the hospice team as requested by his wife. Patient met the criteria for hospice and will be discharged to the inpatient hospice unit this afternoon      . #1 multiple system failure. #2 acute on chronic respiratory failure. #3 cardiac dysrhythmia. #4 acute kidney failure with hyperkalemia. #5 myocardial infarction. #6 sepsis. #7 of decubitus ulcerations. #8 protein malnutrition.

## 2018-01-23 NOTE — PROGRESS NOTES
7.  Acute kidney injury with severe hyperkalemia  8. Acute ischemic coronary syndrome  Overall prognosis is extremely poor patient is comfort measures only at this point. We will consult hospice continue current measures for now but no escalation of treatment.   After evaluation by hospice comfort measures only with no additional treatment will be offered

## 2018-01-23 NOTE — PROGRESS NOTES
Dr. Dallas Landin @ bedside to assess patient; updated on current status; spoke w/ patients wife on change of code status. New orders received to change code to [facility] w/ Hospice consult, d/c NG tube, NS @ 150ml/hr, and stop Heparin drip. Orders verbalized back and verified.

## 2018-01-23 NOTE — PROGRESS NOTES
Dr. Armando Singer informed of patients acceptance to Hospice w/ transfer time set for 5pm today; informed of need for discharge to transfer orders, along w/ d/c of all meds except pain medications, per Hospice MD request. Per Dr. Armando Singer, ok to wean patient off Levophed drip to saline lock now and all other meds will be d/c'ed, except pain medication.      Error on time stamp

## 2018-01-23 NOTE — PROGRESS NOTES
Levophed drip currently infusing @ 65mcg/min; Dr. Shannon Person aware and states ok to continue drip @ current rate; code status changed to [facility]; no escalation of care.

## 2018-01-23 NOTE — PROGRESS NOTES
Progress Note    POD # 6    Patient is Christy Grant       Chest tubes: Draining fluid and air leak is noted. Called by Dr Israel Rose at approx 0315 this am with information that the patient demonstrated clinical and EKG changes consistent with acute infarction. Dr Petra Romero spoke with the wife and a DNR order with Hospice request is now in place.      Electronically signed by Dimple Bagley MD on 1/23/2018 at 10:44 AM

## 2018-01-23 NOTE — PLAN OF CARE
[facility] referral at [facility]. Met with wife Becky Roy. Choice of hospice  offered. Hospice care, services, levels of care and Medicare benefit reviewed. Becky Roy is choosing comfort care with [id number]. POC discussed with Sophie Horta, Balta HECTOR and Dr. Ophelia Schroeder. Dr. Erin Márquez to follow at [facility]. [facility] to transport to Center today at 1700.

## 2018-01-23 NOTE — PROGRESS NOTES
Transport here to transfer patient to Hospice; family @ bedside. Chest tubes Y connected to water seal, O2 @ 4L per NC, right upper arm triple lumen PICC call capped, 16fr thomson cath draining scant amount of dark brown cloudy mucousy urine, peg tube clamped; all are maintained for transport. Paperwork given to transporter, along / [facility] form. Brief report given to transport.

## 2018-01-23 NOTE — CARE COORDINATION
Patient overall clinically worse, 1/22 events and doctors notes reviewed. Continue to follow for needs pending medical course. Hospice consulted and code status changed.  Continue to follow pending family wishes and Hospice eval.

## 2018-01-23 NOTE — PROGRESS NOTES
Report called to Nurse Az Membreno, at [facility].. Discussed current  POC, patient current status, wound care, peg tube that is currently clamped, chest tube care that has been placed to water seal, skin integrity and current body rash, O2 @ 4L per NC, triple lumen PICC line to right upper arm that is currently capped w/ no fluids infusing, and code status of [facility]. Nurse denies any questions/concerns @ this time; requesting pain medication be given prior to transport; transport set for 1700.

## 2018-01-23 NOTE — PROGRESS NOTES
Return call received from Dr. Chanel Goodman; updated on patients current status, code change to [facility], and Hospice consult.

## 2018-01-24 LAB
GRAM STAIN RESULT: ABNORMAL
ORGANISM: ABNORMAL
URINE CULTURE, ROUTINE: NORMAL
WOUND/ABSCESS: ABNORMAL
WOUND/ABSCESS: ABNORMAL

## 2018-01-27 LAB — CULTURE, BLOOD 2: NORMAL

## 2018-01-28 LAB — BLOOD CULTURE, ROUTINE: NORMAL

## 2018-02-03 LAB
FINAL REPORT: NORMAL
PRELIMINARY: NORMAL

## 2020-08-05 NOTE — CARE COORDINATION
Jeff Dawson  Identified pt with two pt identifiers(name and ). Chief Complaint   Patient presents with   [facility] Annual Wellness Visit       1. Have you been to the ER, urgent care clinic since your last visit? Hospitalized since your last visit? Saw his urologist for f/up of his PSA. 2. Have you seen or consulted any other health care providers outside of the [address] [place] [address] since your last visit? Include any pap smears or colon screening. no      Health Maintenance Topics with due status: Overdue       Topic Date Due    DTaP/Tdap/Td series 11/10/1964    Shingrix Vaccine Age 50> 11/10/1993    GLAUCOMA SCREENING Q2Y 2020    Eye Exam Retinal or Dilated 2020    Influenza Age 9 to Adult 2020    MICROALBUMIN Q1 2020     Health Maintenance Topics with due status: Due On       Topic Date Due    Medicare Yearly Exam 2020     Health Maintenance Topics with due status: Not Due       Topic Last Completion Date    Foot Exam Q1 2020    Lipid Screen 2020     Health Maintenance Topics with due status: Completed       Topic Last Completion Date    Pneumococcal 65+ years 2018           Medication reconciliation up to date and corrected with patient at this time. Today's provider has been notified of reason for visit, vitals and flowsheets obtained on patients. Reviewed record in preparation for visit, huddled with provider and have obtained necessary documentation.         Wt Readings from Last 3 Encounters:   20 235 lb (106.6 kg)   20 236 lb 1.6 oz (107.1 kg)   20 236 lb (107 kg)     Temp Readings from Last 3 Encounters:   20 97.1 °F (36.2 °C) (Oral)   20 97.5 °F (36.4 °C) (Oral)   19 97.5 °F (36.4 °C)     BP Readings from Last 3 Encounters:   20 130/66   20 136/56   19 134/62     Pulse Readings from Last 3 Encounters:   20 (!) 56   20 (!) 52   19 60     There were no vitals filed for Patient planned for discharge to Hospice today @ 5pm. this visit. Learning Assessment:  :     Learning Assessment 1/17/2019 8/25/2017 8/2/2017   PRIMARY LEARNER Patient Patient Patient   HIGHEST LEVEL OF EDUCATION - PRIMARY LEARNER  - 2 YEARS OF COLLEGE -   CO-LEARNER CAREGIVER - - No   PRIMARY LANGUAGE ENGLISH ENGLISH ENGLISH   LEARNER PREFERENCE PRIMARY DEMONSTRATION LISTENING READING   ANSWERED BY patient patient patient   RELATIONSHIP SELF SELF SELF       Depression Screening:  :     3 most recent PHQ Screens 8/5/2020   Little interest or pleasure in doing things Not at all   Feeling down, depressed, irritable, or hopeless Not at all   Total Score PHQ 2 0   Trouble falling or staying asleep, or sleeping too much -   Feeling tired or having little energy -   Poor appetite, weight loss, or overeating -   Feeling bad about yourself - or that you are a failure or have let yourself or your family down -   Trouble concentrating on things such as school, work, reading, or watching TV -   Moving or speaking so slowly that other people could have noticed; or the opposite being so fidgety that others notice -   Thoughts of being better off dead, or hurting yourself in some way -   PHQ 9 Score -       No flowsheet data found. Fall Risk Assessment:  :     Fall Risk Assessment, last 12 mths 8/5/2020   Able to walk? Yes   Fall in past 12 months? No       Abuse Screening:  :     Abuse Screening Questionnaire 8/5/2020 1/17/2019 4/2/2018 8/25/2017   Do you ever feel afraid of your partner? N N N N   Are you in a relationship with someone who physically or mentally threatens you? N N N N   Is it safe for you to go home?  Y Y Y Y       ADL Screening:  :     ADL Assessment 8/5/2020   Feeding yourself No Help Needed   Getting from bed to chair No Help Needed   Getting dressed No Help Needed   Bathing or showering No Help Needed   Walk across the room (includes cane/walker) No Help Needed   Using the telphone No Help Needed   Taking your medications No Help Needed   Preparing meals No Help Needed   Managing money (expenses/bills) No Help Needed   Moderately strenuous housework (laundry) No Help Needed   Shopping for personal items (toiletries/medicines) No Help Needed   Shopping for groceries No Help Needed   Driving No Help Needed   Climbing a flight of stairs No Help Needed   Getting to places beyond walking distances No Help Needed

## (undated) DEVICE — TUBE SET 96 MM 64 MM H2O PERISTALTIC STD AUX CHANNEL

## (undated) DEVICE — ADAPTER FLSH PMP FLD MGMT GI IRRIG OFP 2 DISPOSABLE

## (undated) DEVICE — CANNULA CAPNOGRAPHY AD O2 TBNG L7FT FEM LUER STYL 4707F7725] SALTER LABS INC]

## (undated) DEVICE — SPECIMEN TRAP: Brand: ARGYLE

## (undated) DEVICE — GAUZE,SPONGE,2"X2",8PLY,STERILE,LF,2'S: Brand: MEDLINE

## (undated) DEVICE — SYRINGE MED 50ML LUERLOCK TIP

## (undated) DEVICE — TOWEL,OR,DSP,ST,BLUE,STD,4/PK,20PK/CS: Brand: MEDLINE

## (undated) DEVICE — INTENDED FOR TISSUE SEPARATION, AND OTHER PROCEDURES THAT REQUIRE A SHARP SURGICAL BLADE TO PUNCTURE OR CUT.: Brand: BARD-PARKER ® CARBON RIB-BACK BLADES

## (undated) DEVICE — ENDO CARRY-ON PROCEDURE KIT: Brand: ENDO CARRY-ON PROCEDURE KIT

## (undated) DEVICE — GLOVE SURG SZ 85 L12IN FNGR THK94MIL TRNSLUC YEL LTX

## (undated) DEVICE — STERILE POLYISOPRENE POWDER-FREE SURGICAL GLOVES: Brand: PROTEXIS

## (undated) DEVICE — SUTURE VCRL SZ 2-0 L36IN ABSRB UD L36MM CT-1 1/2 CIR J945H

## (undated) DEVICE — PACK,LAPAROTOMY,NO GOWNS: Brand: MEDLINE

## (undated) DEVICE — SHEET,DRAPE,53X77,STERILE: Brand: MEDLINE

## (undated) DEVICE — Z DISCONTINUED USE 2516375 APPLICATOR MEDICATED 3 CC CLR STRL CHLORAPREP

## (undated) DEVICE — PAD,NON-ADHERENT,3X8,STERILE,LF,1/PK: Brand: MEDLINE

## (undated) DEVICE — COVER,TABLE,44X90,STERILE: Brand: MEDLINE

## (undated) DEVICE — ELECTRODE PT RET AD L9FT HI MOIST COND ADH HYDRGEL CORDED

## (undated) DEVICE — SUTURE PERMA-HAND SZ 2-0 L30IN NONABSORBABLE BLK L30MM FSL 679H

## (undated) DEVICE — MEDI-VAC YANKAUER SUCTION HANDLE W/BULBOUS TIP: Brand: CARDINAL HEALTH

## (undated) DEVICE — CATHETER THOR 32FR L23IN PVC 5 EYELET STR ATRAUM

## (undated) DEVICE — PENCIL ES L3M BTTN SWCH HOLSTER W/ BLDE ELECTRD EDGE

## (undated) DEVICE — 1842 FOAM BLOCK NEEDLE COUNTER: Brand: DEVON

## (undated) DEVICE — MEDI-VAC NON-CONDUCTIVE SUCTION TUBING: Brand: CARDINAL HEALTH

## (undated) DEVICE — SPONGE,LAP,18"X18",DLX,XR,ST,5/PK,40/PK: Brand: MEDLINE

## (undated) DEVICE — SUTURE VCRL + SZ 4-0 L27IN ABSRB UD PS-2 3/8 CIR REV CUT VCP426H

## (undated) DEVICE — UNIT DRNGE SGL COLL W/ INLINE CONN EXPR

## (undated) DEVICE — CATHETER THOR 32FR L23IN PVC 6 EYELET STR ATRAUM

## (undated) DEVICE — CHLORAPREP 26ML ORANGE

## (undated) DEVICE — Device: Brand: ENDO SMARTCAP

## (undated) DEVICE — 3M™ STERI-DRAPE™ INCISE DRAPE, XL 1051: Brand: STERI-DRAPE™

## (undated) DEVICE — NEEDLE ENDO AD L230CM OD0.5MM 0DEG NONBITING BVL GI DISP

## (undated) DEVICE — DISSECTOR LAP DIA5MM BLNT TIP ENDOPATH

## (undated) DEVICE — SONY PRINTER PAPER

## (undated) DEVICE — LABEL MED MED CHPOR

## (undated) DEVICE — GLOVE SURG SZ 85 STD WHT LTX SYN POLYMER BEAD REINF ANTI RL

## (undated) DEVICE — SPONGE DRN W4XL4IN RAYON/POLYESTER 6 PLY NONWOVEN PRECUT

## (undated) DEVICE — 3M™ STERI-STRIP™ REINFORCED ADHESIVE SKIN CLOSURES, R1547, 1/2 IN X 4 IN (12 MM X 100 MM), 6 STRIPS/ENVELOPE: Brand: 3M™ STERI-STRIP™

## (undated) DEVICE — RESTRAINT EXTREMITY CONTACT CLOSURE

## (undated) DEVICE — X-RAY DETECTABLE SPONGES,16 PLY: Brand: VISTEC

## (undated) DEVICE — WARMER LAPSCP BST 2 STG STRL DISP HEAT BLU

## (undated) DEVICE — SKIN MARKER,REGULAR TIP WITH RULER: Brand: DEVON

## (undated) DEVICE — LUBRICANT SURG JELLY ST BACTER TUBE 4.25OZ

## (undated) DEVICE — AIRLIFE™ ADULT OXYGEN MASK VINYL, UNDER-THE-CHIN STYLE, MEDIUM CONCENTRATION MASK WITH 7 FEET (2.1 M) CRUSH-RESISTANT OXYGEN TUBING: Brand: AIRLIFE™

## (undated) DEVICE — CATHETER SUCT TRACH OPN N GRAD W/ THMB VLV 14FR 22IN

## (undated) DEVICE — 3M™ TEGADERM™ TRANSPARENT FILM DRESSING FRAME STYLE, 1624W, 2-3/8 IN X 2-3/4 IN (6 CM X 7 CM), 100/CT 4CT/CASE: Brand: 3M™ TEGADERM™

## (undated) DEVICE — SUTURE VCRL SZ 0 L36IN ABSRB UD L36MM CT-1 1/2 CIR J946H

## (undated) DEVICE — CONMED SCOPE SAVER BITE BLOCK, 20X27 MM: Brand: SCOPE SAVER

## (undated) DEVICE — STRAP SECUREMENT L AD W1.25XL2.75IN CATH ADH CATH-SECURE

## (undated) DEVICE — NEEDLE HYPO 18GA L1.5IN THN WALL PIVOTING SHLD BVL ORIENTED

## (undated) DEVICE — SYRINGE BLB 50CC IRRIG PLIABLE FNGR FLNG GRAD FLSK DISP

## (undated) DEVICE — 2000CC GUARDIAN II: Brand: GUARDIAN

## (undated) DEVICE — 3M™ STERI-DRAPE™ INSTRUMENT POUCH 1018: Brand: STERI-DRAPE™

## (undated) DEVICE — Z DISCONTINUED PER STERIS KIT PEG INIT PLCMNT SAFT 20FR

## (undated) DEVICE — DEFOGGER!" ANTI FOG KIT: Brand: DEROYAL

## (undated) DEVICE — TROCAR ENDOSCP L80MM DIA20MM THOR FLX SL OBT DISP FLXPATH